# Patient Record
Sex: MALE | Race: WHITE | NOT HISPANIC OR LATINO | Employment: OTHER | URBAN - METROPOLITAN AREA
[De-identification: names, ages, dates, MRNs, and addresses within clinical notes are randomized per-mention and may not be internally consistent; named-entity substitution may affect disease eponyms.]

---

## 2018-06-08 ENCOUNTER — TELEPHONE (OUTPATIENT)
Dept: OTOLARYNGOLOGY | Facility: CLINIC | Age: 63
End: 2018-06-08

## 2018-06-08 NOTE — TELEPHONE ENCOUNTER
----- Message from Fern Turner sent at 6/8/2018  1:18 PM CDT -----  Contact: patient wife  [phone]-please call above patient wife at number in message need to reschedule appointment

## 2018-06-08 NOTE — TELEPHONE ENCOUNTER
Pt's appt pushed back, per request, however, advised to call Dr. Munoz's office and notify of delaying appt. Verbalized understanding.

## 2018-07-17 ENCOUNTER — OFFICE VISIT (OUTPATIENT)
Dept: OTOLARYNGOLOGY | Facility: CLINIC | Age: 63
End: 2018-07-17
Payer: COMMERCIAL

## 2018-07-17 VITALS
HEART RATE: 52 BPM | DIASTOLIC BLOOD PRESSURE: 64 MMHG | SYSTOLIC BLOOD PRESSURE: 102 MMHG | WEIGHT: 237.44 LBS | TEMPERATURE: 98 F

## 2018-07-17 DIAGNOSIS — C82.21 GRADE 3 FOLLICULAR LYMPHOMA OF LYMPH NODES OF NECK: ICD-10-CM

## 2018-07-17 DIAGNOSIS — I48.91 ATRIAL FIBRILLATION, UNSPECIFIED TYPE: ICD-10-CM

## 2018-07-17 DIAGNOSIS — I10 ESSENTIAL HYPERTENSION: ICD-10-CM

## 2018-07-17 DIAGNOSIS — C09.9 SQUAMOUS CELL CARCINOMA OF LEFT TONSIL: Primary | ICD-10-CM

## 2018-07-17 PROCEDURE — 31575 DIAGNOSTIC LARYNGOSCOPY: CPT | Mod: S$GLB,,, | Performed by: OTOLARYNGOLOGY

## 2018-07-17 PROCEDURE — 99205 OFFICE O/P NEW HI 60 MIN: CPT | Mod: 25,S$GLB,, | Performed by: OTOLARYNGOLOGY

## 2018-07-17 PROCEDURE — 99999 PR PBB SHADOW E&M-EST. PATIENT-LVL IV: CPT | Mod: PBBFAC,,, | Performed by: OTOLARYNGOLOGY

## 2018-07-17 RX ORDER — ROSUVASTATIN CALCIUM 10 MG/1
10 TABLET, COATED ORAL EVERY MORNING
COMMUNITY

## 2018-07-17 RX ORDER — ASPIRIN 81 MG/1
81 TABLET ORAL DAILY
COMMUNITY

## 2018-07-17 RX ORDER — CANDESARTAN 8 MG/1
8 TABLET ORAL EVERY MORNING
COMMUNITY

## 2018-07-17 RX ORDER — ATENOLOL 50 MG/1
50 TABLET ORAL EVERY MORNING
COMMUNITY

## 2018-07-17 RX ORDER — LIDOCAINE HYDROCHLORIDE 10 MG/ML
1 INJECTION, SOLUTION EPIDURAL; INFILTRATION; INTRACAUDAL; PERINEURAL ONCE
Status: CANCELLED | OUTPATIENT
Start: 2018-07-17 | End: 2018-07-17

## 2018-07-17 NOTE — LETTER
July 25, 2018      Rj Munoz MD  [address] [place] [address]           [facility]  [address]  [facility] [address]  Phone: [phone]  Fax: [phone]          Patient: Austyn Muñoz   MR Number: [id number]   YOB: 1955   Date of Visit: 7/17/2018       Dear Dr. Rj Munoz:    Thank you for referring Austyn Muñoz to me for evaluation. Attached you will find relevant portions of my assessment and plan of care.    If you have questions, please do not hesitate to call me. I look forward to following Austyn Muñoz along with you.    Sincerely,    Pedro Bettencourt  CC:  Art Grant MD    If you would like to receive this communication electronically, please contact [email or web address] or [phone] to request more information on [employer] Link access.    For providers and/or their staff who would like to refer a patient to [facility], please contact us through our one-stop-shop provider referral line, [facility], at [phone].    If you feel you have received this communication in error or would no longer like to receive these types of communications, please e-mail [email or web address]

## 2018-07-17 NOTE — PATIENT INSTRUCTIONS
The plan will be to re-excise the tonsil area and look at the throat carefully, then do the neck dissection.    We discussed the risks, benefits, and indications to left neck dissection. The risks were noted to include, but not be limited to, infection, bleeding, scarring, collection of blood or tissue fluid requiring drainage, weakness of the lip which may be temporary or permanent, weakness of the tongue which could be temporary or permanent and cause speech and/or swallowing difficulty, weakness of the shoulder which could be temporary or permanent, pain, chyle leakage which would require dietary modification and perhaps additional procedures, and the need for additional procedures.

## 2018-07-17 NOTE — PROGRESS NOTES
Subjective:       Patient ID: Austyn Muñoz is a 62 y.o. male.    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 Munoz for p16+ SCC of the left tonsil. He has been followed at [place] and at [facility] for relapsed follicular lymphoma which was initially treated with R-CHOP, then with Rituxan with good response. He is reportedly in remission from this. In early 2018, a routine PET scan revealed a slightly hypermetabolic area in the left tonsil, and a follow up PET in April revealed increased hypermetabolic activity in the left tonsil (10.2). Given his history, he underwent tonsillectomy with Dr. Munoz on 5/14. Prior to surgery, he denies sore throat, dysphagia, odynophagia, changes in his voice, or hemoptysis. He lost weight initially after surgery, but has gained it all back. He is breathing comfortably. He is a former smoker (1pack/day x 30 years), he quit 15 years ago. He also reports using smokeless tobacco x 40 years, and quit 2 months ago. He was referred here at that time, but he desired a second opinion and wanted to wait until July to come here.    He got a second opinion at [facility] (Dr. Koko Gallagher, Dr. Bowie, and Dr. Stanton). He had another CT and PET scan at [facility], which did not reveal any lymphadenopathy or evidence of disease. Ultimately, a neck dissection was  recommended, but he prefers to have surgery closer to home, so Dr. Gallagher referred him here. He basically has no complaints today.  He denies dysphagia, odynophagia, throat pain, and otalgia. His voice is normal. There is no hemoptysis or hematemesis. He is breathing well.    Past Medical History:   Diagnosis Date    Atrial fibrillation, currently in sinus rhythm     Glaucoma     Hyperlipidemia     Hypertension     Neuropathy     Non Hodgkin's lymphoma     in remission    Squamous cell carcinoma of left tonsil        Past Surgical History:   Procedure Laterality Date    ATRIAL ABLATION SURGERY      CERVICAL FUSION      CERVICAL SPINE SURGERY      SHOULDER SURGERY      TONSILLECTOMY           Current Outpatient Prescriptions:     aspirin (ECOTRIN) 81 MG EC tablet, Take 81 mg by mouth once daily., Disp: , Rfl:     atenolol (TENORMIN) 50 MG tablet, Take 50 mg by mouth once daily., Disp: , Rfl:     candesartan (ATACAND) 8 MG tablet, Take 8 mg by mouth once daily., Disp: , Rfl:     rosuvastatin (CRESTOR) 10 MG tablet, Take 10 mg by mouth once daily., Disp: , Rfl:     travoprost, benzalkonium, (TRAVATAN) 0.004 % ophthalmic solution, 1 drop every evening., Disp: , Rfl:     Review of patient's allergies indicates:   Allergen Reactions    Augmentin [amoxicillin-pot clavulanate] Rash       Social History     Social History    Marital status:      Spouse name: N/A    Number of children: N/A    Years of education: N/A     Occupational History    Not on file.     Social History Main Topics    Smoking status: Former Smoker     Quit date: 9/17/2006    Smokeless tobacco: Former User     Quit date: 5/13/2018    Alcohol use Not on file    Drug use: Unknown    Sexual activity: Not on file     Other Topics Concern    Not on file     Social History Narrative    No narrative on file       Family History   Problem Relation Age of Onset    Cancer Mother         breast    Hypertension Mother      Coronary artery disease Mother     Cancer Father         esophageal       Review of Systems   Constitutional: Negative for appetite change, chills, diaphoresis, fatigue, fever and unexpected weight change.   HENT: Positive for trouble swallowing and voice change. Negative for congestion, dental problem, drooling, ear discharge, ear pain, facial swelling, hearing loss, mouth sores, nosebleeds, postnasal drip, rhinorrhea, sinus pressure, sneezing, sore throat and tinnitus.    Eyes: Negative for pain, discharge, redness, itching and visual disturbance.   Respiratory: Negative for cough and shortness of breath.    Cardiovascular: Negative for chest pain and palpitations.   Gastrointestinal: Negative for abdominal distention, abdominal pain, constipation, diarrhea, nausea and vomiting.   Endocrine: Negative for cold intolerance and heat intolerance.   Genitourinary: Negative for difficulty urinating and dysuria.   Musculoskeletal: Positive for arthralgias. Negative for back pain, neck pain and neck stiffness.   Skin: Negative for color change and rash.   Neurological: Negative for dizziness, seizures, weakness and headaches.   Hematological: Negative for adenopathy. Does not bruise/bleed easily.   Psychiatric/Behavioral: Negative for agitation. The patient is not nervous/anxious.        Objective:      Physical Exam   Constitutional: He is oriented to person, place, and time. He appears well-developed and well-nourished. He is cooperative. He does not appear ill. No distress.   HENT:   Head: Normocephalic and atraumatic.   Right Ear: Hearing, tympanic membrane, external ear and ear canal normal.   Left Ear: Hearing, tympanic membrane, external ear and ear canal normal.   Nose: Septal deviation present. No mucosal edema, rhinorrhea or nasal deformity. No epistaxis.  No foreign bodies. Right sinus exhibits no maxillary sinus tenderness and no frontal sinus tenderness. Left sinus exhibits no maxillary sinus tenderness and  no frontal sinus tenderness.   Mouth/Throat: Uvula is midline, oropharynx is clear and moist and mucous membranes are normal. Mucous membranes are not pale, not dry and not cyanotic. He does not have dentures. No oral lesions. No trismus in the jaw. Normal dentition. No uvula swelling or dental caries. No oropharyngeal exudate, posterior oropharyngeal edema, posterior oropharyngeal erythema or tonsillar abscesses.       Procedure: Flexible laryngoscopy  In order to fully examine the upper aerodigestive tract, including the larynx, in a patient with a hyperactive gag reflex, flexible endoscopy is required.  After explaining the procedure and obtaining verbal consent, a timeout was performed with the patient's participation according to the universal protocol. Both nasal cavities were anesthetized with 4% Xylocaine spray mixed with Mick-Synephrine. The flexible laryngoscope (#[id number]) was inserted into the nasal cavity and advanced to visualize the nasal cavity, nasopharynx, the posterior oropharynx, hypopharynx, and the endolarynx with the above findings noted. The scope was removed and the procedure terminated. The patient tolerated this procedure well without apparent complication.      FINDINGS  Nasopharynx - the torus is clear. There are no lesions of the posterior wall.   Oropharynx - no lesions of the tongue base. There is no obvious fullness or asymmetry. I see no masses or lesions  Hypopharynx - there are no lesions of the pyriform sinuses or postcricoid region    Larynx - there are no lesions of the supraglottic or glottic larynx. Vocal fold mobility is normal with complete closure.      Eyes: Conjunctivae and EOM are normal. Pupils are equal, round, and reactive to light. Right eye exhibits no chemosis and no discharge. Left eye exhibits no chemosis and no discharge. No scleral icterus.   Neck: Trachea normal, normal range of motion and phonation normal. Neck supple. No JVD present. No tracheal tenderness  present. No tracheal deviation and no edema present. No thyroid mass and no thyromegaly present.   Salivary glands - there are no lesions or asymmetric findings in the submandibular or parotid glands     Cardiovascular: Normal rate, regular rhythm and intact distal pulses.    Pulmonary/Chest: Effort normal. No accessory muscle usage or stridor. No tachypnea. No respiratory distress.   Abdominal: Normal appearance. He exhibits no distension. There is no guarding.   Lymphadenopathy:        Head (right side): No submental, no submandibular, no tonsillar and no preauricular adenopathy present.        Head (left side): No submental, no submandibular, no tonsillar and no preauricular adenopathy present.     He has no cervical adenopathy.        Right cervical: No deep cervical and no posterior cervical adenopathy present.       Left cervical: No deep cervical and no posterior cervical adenopathy present.        Right: No supraclavicular adenopathy present.        Left: No supraclavicular adenopathy present.   Neurological: He is alert and oriented to person, place, and time. No cranial nerve deficit. Gait normal.   Skin: Skin is warm and dry. No lesion and no rash noted. He is not diaphoretic. No erythema. No pallor.   Psychiatric: He has a normal mood and affect. His speech is normal and behavior is normal. Thought content normal.   Vitals reviewed.      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 male who presents with an incidentally detected pT1 cN0 p16+ SCC of the left tonsil after a  tonsillectomy performed for an abnormal PET in the setting of prior relapsed follicular lymphoma. He was and remains asymptomatic, and he has no evidence of regional disease. Although the tumor is p16+, he is in an intermediate risk category because of his prior smoking history, so that further clouds the situation as we try to give him the treatment he needs without overtreating. I noted that his options are essentially additional surgery versus radiation therapy (to the primary site and the neck) or observation. His smoking history makes me averse to recommending observation. By report the margin is about 1.3mm away from tumor, and that is one that we often accept though regard as close in transoral surgery, though officially negative. He is at high risk for regional metastases, and we discussed how such small tumors as he had often present only with the neck metastases.    We discussed the risks and benefits of each approach, including short and long term side effects of radiation therapy and surgery. I am a little concerned by the left tonsil inferior pole abnormality, which could be just ongoing healing, but residual or recurrent disease is a possibility. Ultimately, I recommended a selective neck dissection of levels Ib (basically nodes posterior to the facial vessels, saving the gland) -IV and a re-excision of the left tonsil fossa because of the abnormality. He agrees with this plan.    We discussed the risks, benefits, indications, and alternatives to Transoral Oropharyngeal Resection using either the DaVinci system, manual techniques, or the CO2 laser. The choice of robot, laser, or electrocautery depends on patient anatomy and exposure, as the instrument is simply a cutting tool or mechanism - the key component is the transoral approach. The treatment options are noted above. The risks of TORS/transoral surgery were described to include, but not be limited to, infection, bleeding (both on the day of  surgery within the first 4 hours and again 5-14 days after surgery, when the scabs come off), scarring, fire, changes in voice or swallowing, positive margins, the need for staged surgery, pain, and the need for additional procedures or treatments. Time was allowed for questions, and all questions were answered to the patient's apparent satisfaction. He may need a feeding tube placed transnasally for several days to weeks, and some patients even require a PEG tube for swallowing diificulty on a temporary (and rarely permanent) basis. Informed consent was obtained. Because of the limited aims of this surgery for him, this is less likely.    We discussed the risks, benefits, and indications to left neck dissection. The risks were noted to include, but not be limited to, infection, bleeding, scarring, collection of blood or tissue fluid requiring drainage, weakness of the lip which may be temporary or permanent, weakness of the tongue which could be temporary or permanent and cause speech and/or swallowing difficulty, weakness of the shoulder which could be temporary or permanent, pain, chyle leakage which would require dietary modification and perhaps additional procedures, and the need for additional procedures. Time was allowed for questions, and all questions were answered to the patient's apparent satisfaction. Informed consent was obtained.    His surgery has been scheduled for 3 weeks from now. Paperwork will be signed on the morning of surgery.             Relevant Orders    Case Request Operating Room: DISSECTION, NECK, TONSILLECTOMY (Completed)    Essential hypertension    Atrial fibrillation

## 2018-07-24 ENCOUNTER — TUMOR BOARD CONFERENCE (OUTPATIENT)
Dept: OTOLARYNGOLOGY | Facility: CLINIC | Age: 63
End: 2018-07-24

## 2018-07-24 NOTE — PROGRESS NOTES
Austyn Muñoz is a 62 y.o. male with T1N0M0 p16+ SCC of the tonsil.    His case was discussed at the Multidisciplinary Head and Neck Team Planning Meeting on 07/24/2018. Representatives from Medical Oncology, Radiation Oncology, Head and Neck Surgical Oncology, Psychosocial Oncology, and Speech and Language Pathology discussed the case with the following recommendations:    1) Neck dissection with re-excision of tonsil fossa

## 2018-07-28 NOTE — ASSESSMENT & PLAN NOTE
Austyn Muñoz is a 62 y.o. male who presents with an incidentally detected pT1 cN0 p16+ SCC of the left tonsil after a tonsillectomy performed for an abnormal PET in the setting of prior relapsed follicular lymphoma. He was and remains asymptomatic, and he has no evidence of regional disease. Although the tumor is p16+, he is in an intermediate risk category because of his prior smoking history, so that further clouds the situation as we try to give him the treatment he needs without overtreating. I noted that his options are essentially additional surgery versus radiation therapy (to the primary site and the neck) or observation. His smoking history makes me averse to recommending observation. By report the margin is about 1.3mm away from tumor, and that is one that we often accept though regard as close in transoral surgery, though officially negative. He is at high risk for regional metastases, and we discussed how such small tumors as he had often present only with the neck metastases.    We discussed the risks and benefits of each approach, including short and long term side effects of radiation therapy and surgery. I am a little concerned by the left tonsil inferior pole abnormality, which could be just ongoing healing, but residual or recurrent disease is a possibility. Ultimately, I recommended a selective neck dissection of levels Ib (basically nodes posterior to the facial vessels, saving the gland) -IV and a re-excision of the left tonsil fossa because of the abnormality. He agrees with this plan.    We discussed the risks, benefits, indications, and alternatives to Transoral Oropharyngeal Resection using either the DaVinci system, manual techniques, or the CO2 laser. The choice of robot, laser, or electrocautery depends on patient anatomy and exposure, as the instrument is simply a cutting tool or mechanism - the key component is the transoral approach. The treatment options are noted above. The  risks of TORS/transoral surgery were described to include, but not be limited to, infection, bleeding (both on the day of surgery within the first 4 hours and again 5-14 days after surgery, when the scabs come off), scarring, fire, changes in voice or swallowing, positive margins, the need for staged surgery, pain, and the need for additional procedures or treatments. Time was allowed for questions, and all questions were answered to the patient's apparent satisfaction. He may need a feeding tube placed transnasally for several days to weeks, and some patients even require a PEG tube for swallowing diificulty on a temporary (and rarely permanent) basis. Informed consent was obtained. Because of the limited aims of this surgery for him, this is less likely.    We discussed the risks, benefits, and indications to left neck dissection. The risks were noted to include, but not be limited to, infection, bleeding, scarring, collection of blood or tissue fluid requiring drainage, weakness of the lip which may be temporary or permanent, weakness of the tongue which could be temporary or permanent and cause speech and/or swallowing difficulty, weakness of the shoulder which could be temporary or permanent, pain, chyle leakage which would require dietary modification and perhaps additional procedures, and the need for additional procedures. Time was allowed for questions, and all questions were answered to the patient's apparent satisfaction. Informed consent was obtained.    His surgery has been scheduled for 3 weeks from now. Paperwork will be signed on the morning of surgery.

## 2018-08-02 ENCOUNTER — ANESTHESIA EVENT (OUTPATIENT)
Dept: SURGERY | Facility: HOSPITAL | Age: 63
DRG: 129 | End: 2018-08-02
Payer: COMMERCIAL

## 2018-08-02 DIAGNOSIS — E86.0 DEHYDRATION: ICD-10-CM

## 2018-08-02 DIAGNOSIS — Z01.818 PREOPERATIVE TESTING: Primary | ICD-10-CM

## 2018-08-02 RX ORDER — FOLIC ACID 1 MG/1
1 TABLET ORAL DAILY
COMMUNITY
End: 2018-08-17

## 2018-08-02 NOTE — ANESTHESIA PREPROCEDURE EVALUATION
Pre Admission Screening  Leanna Rubalcava RN      []Hide copied text  []Hover for attribution information  Anesthesia Assessment: Preoperative EQUATION     Planned Procedure: Procedure(s) (LRB):  DISSECTION, NECK (Left)  TONSILLECTOMY (Left)  Requested Anesthesia Type:General  Surgeon: Andrea Price MD  Service: ENT  Known or anticipated Date of Surgery:8/15/2018     Surgeon notes: reviewed        Last PCP note: outside [facility]   Subspecialty notes: Cardiology: General, ENT     Other important co-morbidities: per EPIC: HTN/HLP, A fib (S/P ablation 1/2016), non Hodgkins lymphona     Tests already available:  Available tests,  outside [facility] . will ask cards for last EKG/echo                            Instructions given. (See in Nurse's note)     Optimization:  Anesthesia Preop Clinic Assessment  Indicated-pt plans on staying in [place] the night prior to surgery, will come to POC 8/14     Medical Opinion Indicated-will ask cards to clear pt, hold aspirin, and send last testing                                           Plan:    Testing:  BMP, Heme prof, T&S and EKG (if not obtained from OS cards)   Pre-anesthesia  visit                                        Visit focus: concerns in complex and/or prolonged anesthesia                           Consultation:cards                           Patient  has previously scheduled Medical Appointment: none     Navigation: Tests Scheduled. 8/14                        Consults scheduled.                        Results will be tracked by Preop Clinic.                                Electronically signed by Leanna Rubalcava RN at 8/2/2018  3:16 PM        Pre-admit on 8/15/2018            Detailed Report        8/9/18-OS cards clearance/ASA instructions, 2/2018 EKG & last visit note, 1/2017 echo, 2/2017 exercise perfusion test obtained and scanned to media. Preop EKG appt cancelled.                                                                                                        08/02/2018  Pre-operative evaluation for Procedure(s) (LRB):  DISSECTION, NECK (Left)  TONSILLECTOMY (Left)    Austyn Muñoz is a 62 y.o. male with PMHx of glaucoma, chronic diarrhea, cervical disease and lumbar disease, neuropathy, paroxysmal Atrial fibrillation (s/p ablation in 2016), HTN, HLD,CAD, non-hodgkin lymphoma s/p chemotherapy with relapse in 2009 and 2016 treated with rituxan, and now scc of the left tonsil (s/p bilateral tonsillectomy on 5/14/18) who presents for pre-op eval for above procedure.         Patient currently denies any cardiopulmonary symptoms including MARRERO, orthopnea, chest pain, wheezing, palpitations.      Diarrhea improved in stool bulk and frequency.  He has a 30pk/yr history, quit smoking in 2003. Quit smokeless tobacco 5/2018. Patient currently drinks about 3 beers a day. Has never experienced withdrawal symptoms from drinking.    Patient has been cleared by outside cardiologist. Report with ekg and echo can be found in media tab. Patient now in sinus rhythm with rate in the 50s. Echo with EF of 50% without evidence of diastolic dysfunction. No major valvular abnormalities. Exercise Myocardial perfusion 2/2017 without evidence ischemia. CXR from care everywhere with no abormality 5/2018.     Labs from 5/2017, CBC, CMP, INR in care everywhere unrevealing aside from T.bili of 1.4.     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      atenolol (TENORMIN) 50 MG tablet Take 50 mg by mouth once daily.      candesartan (ATACAND) 8 MG tablet Take 8 mg by mouth once daily.      folic acid (FOLVITE) 1 MG tablet Take 1 mg by mouth once daily.      rosuvastatin (CRESTOR) 10 MG tablet Take 10 mg by mouth once daily.      travoprost, benzalkonium, (TRAVATAN) 0.004 % ophthalmic solution 1 drop every evening.       No current facility-administered medications on file prior to encounter.        Past Surgical History:   Procedure Laterality Date    ATRIAL ABLATION SURGERY      CERVICAL FUSION      CERVICAL SPINE SURGERY      SHOULDER SURGERY      TONSILLECTOMY         Social History     Socioeconomic History    Marital status:      Spouse name: Not on file    Number of children: Not on file    Years of education: Not on file    Highest education level: Not on file   Social Needs    Financial resource strain: Not on file    Food insecurity - worry: Not on file    Food insecurity - inability: Not on file    Transportation needs - medical: Not on file    Transportation needs - non-medical: Not on file   Occupational History    Not on file   Tobacco Use    Smoking status: Former Smoker     Last attempt to quit: 2006     Years since quittin.9    Smokeless tobacco: Former User     Quit date: 2018   Substance and Sexual Activity    Alcohol use: Not on file    Drug use: Not on file    Sexual activity: Not on file   Other Topics Concern    Not on file   Social History Narrative    Not on file         Vital Signs Range (Last 24H):         No results found for: WBC, HGB, HCT, MCV, PLT      No results found for: NA, K, CL, CO2, GLU, BUN, CREATININE, CALCIUM, PROT, ALBUMIN, BILITOT, ALKPHOS, AST, ALT, ANIONGAP, ESTGFRAFRICA, EGFRNONAA    No results found for: INR,  PROTIME        Diagnostic Studies:  CXR (care everywhere [facility])  Result Narrative   CHEST EPA AND LATERAL   5/7/2018 1105 hours    COMPARISON: None.    HISTORY: Preoperative for tonsillectomy.    FINDINGS: Heart size is within normal limits.   No pulmonary opacity or pleural effusion is evident.         EKG:  Ordered  Media 2/2018- Sinus bradycardia rate of 54    2D Echo: 1/2017 in media  EF 60%, diastolic function normal for age, minimal TR and minimal MR        Anesthesia Evaluation    I have reviewed the Patient Summary Reports.    I have reviewed the Nursing Notes.   I have reviewed the Medications.     Review of Systems  Anesthesia Hx:  Hx of Anesthetic complications (shivering post-operatively)  History of prior surgery of interest to airway management or planning: Previous anesthesia: General, MAC Denies Family Hx of Anesthesia complications.  Personal Hx of Anesthesia complications (shivering post-operatively)   Social:  Former Smoker, Alcohol Use    Hematology/Oncology:         -- Denies Anemia: Current/Recent Cancer. (non-hodgkin lymphoma, scc of tonsil) chemotherapy and surgery   EENT/Dental:   denies chronic allergic rhinitis  Denies Otitis Media Denies Chronic Tonsillitis   Cardiovascular:   Hypertension CAD    Denies Angina.         hyperlipidemia        Pulmonary:   Denies COPD.  Denies Shortness of breath.  Denies Sleep Apnea.    Renal/:   Denies Chronic Renal Disease. no renal calculi     Hepatic/GI:   Denies PUD. Denies GERD. Denies Liver Disease.    Musculoskeletal:  Spine Disorders: cervical and lumbar Disc disease and Degenerative disease    Neurological:   Denies TIA. Denies CVA.  Denies Headaches. Denies Seizures.   Peripheral Neuropathy    Endocrine:   Denies Diabetes. Denies Hypothyroidism. Denies Hyperthyroidism.    Psych:   Denies Psychiatric History. denies anxiety denies depression          Physical Exam  General:  Well nourished    Airway/Jaw/Neck:  Airway Findings:  Mouth Opening: Normal Tongue: Normal  General Airway Assessment: Adult  Mallampati: III  TM Distance: 4 - 6 cm  Jaw/Neck Findings:  Neck ROM: Extension Decreased, Severe, Decreased Lateral Motion, to the right, to the left     Eyes/Ears/Nose:  EYES/EARS/NOSE FINDINGS: Normal   Dental:  Dental Findings: molar caps    Chest/Lungs:  Chest/Lungs Findings: Clear to auscultation     Heart/Vascular:  Heart Findings: Rate: Normal  Rhythm: Regular Rhythm  Sounds: Normal     Abdomen:  Abdomen Findings:  Soft, Nontender      Skin:  Skin Findings: Normal    Mental Status:  Mental Status Findings:  Cooperative, Alert and Oriented         Anesthesia Plan  Type of Anesthesia, risks & benefits discussed:  Anesthesia Type:  general  Patient's Preference:   Intra-op Monitoring Plan: standard ASA monitors  Intra-op Monitoring Plan Comments:   Post Op Pain Control Plan: multimodal analgesia, IV/PO Opioids PRN and per primary service following discharge from PACU  Post Op Pain Control Plan Comments:   Induction:   IV  Beta Blocker:  Patient is on a Beta-Blocker and has received one dose within the past 24 hours (No further documentation required).       Informed Consent: Patient understands risks and agrees with Anesthesia plan.  Questions answered. Anesthesia consent signed with patient.  ASA Score: 3     Day of Surgery Review of History & Physical:    H&P update referred to the surgeon.         Ready For Surgery From Anesthesia Perspective.

## 2018-08-14 ENCOUNTER — TELEPHONE (OUTPATIENT)
Dept: OTOLARYNGOLOGY | Facility: CLINIC | Age: 63
End: 2018-08-14

## 2018-08-14 ENCOUNTER — HOSPITAL ENCOUNTER (OUTPATIENT)
Dept: PREADMISSION TESTING | Facility: HOSPITAL | Age: 63
Discharge: HOME | DRG: 129 | End: 2018-08-14
Attending: ANESTHESIOLOGY
Payer: COMMERCIAL

## 2018-08-14 VITALS
SYSTOLIC BLOOD PRESSURE: 103 MMHG | BODY MASS INDEX: 32.23 KG/M2 | RESPIRATION RATE: 18 BRPM | HEART RATE: 57 BPM | HEIGHT: 72 IN | WEIGHT: 238 LBS | DIASTOLIC BLOOD PRESSURE: 56 MMHG | OXYGEN SATURATION: 96 % | TEMPERATURE: 98 F

## 2018-08-14 DIAGNOSIS — K52.9 CHRONIC DIARRHEA: ICD-10-CM

## 2018-08-14 DIAGNOSIS — I25.10 CORONARY ARTERY DISEASE INVOLVING NATIVE HEART, ANGINA PRESENCE UNSPECIFIED, UNSPECIFIED VESSEL OR LESION TYPE: ICD-10-CM

## 2018-08-14 DIAGNOSIS — M51.36 BULGING LUMBAR DISC: ICD-10-CM

## 2018-08-14 DIAGNOSIS — G62.9 NEUROPATHY: ICD-10-CM

## 2018-08-14 DIAGNOSIS — H40.9 GLAUCOMA, UNSPECIFIED GLAUCOMA TYPE, UNSPECIFIED LATERALITY: ICD-10-CM

## 2018-08-14 NOTE — PRE-PROCEDURE INSTRUCTIONS
Pt was seen in the pre-op center today. Pre-op instructions given including NPO after MN, medications to take/hold the morning of surgery, arrival procedure and location, shower with antibacterial soap; anesthesia type discussed and pre-op assessment completed. Pts questions answered and concerns addressed. Pt verbalized understanding.     Refilled for pt.

## 2018-08-14 NOTE — DISCHARGE INSTRUCTIONS
Your surgery has been scheduled for:__________________________________________    You should report to:  ____[facility], located on the [place] side of the first floor of the           [facility] ([phone])  ____[facility], located on the [facility] side of the            Second floor of the [facility] ([phone])  ____3rd Floor [facility] located on the [facility] side of the [facility] [phone]  Please Note   - Tell your doctor if you take Aspirin, products containing Aspirin, herbal medications  or blood thinners, such as Coumadin, Ticlid, or Plavix.  (Consult your provider regarding holding or stopping before surgery).  - Arrange for someone to drive you home following surgery.  You will not be allowed to leave the surgical facility alone or drive yourself home following sedation and anesthesia.  Before Surgery  - Stop taking all herbal medications 14days prior to surgery  - No Motrin/Advil (Ibuprofen) 7 days before surgery  - No Aleve (Naproxen) 7 days before surgery  - Stop Taking Asprin, products containing Asprin _____days before surgery  - Stop taking blood thinners_______days before surgery  - No Goody's/BC  Powder 7 days before surgery  - Refrain from drinking alcoholic beverages for 24hours before and after surgery  - Stop or limit smoking _________days before surgery  - You may take Tylenol for pain    Night before Surgery    NOTHING TO EAT OR DRINK AFTER MIDNIGHT (OR FOLLOW SURGEON'S INSTRUCTIONS)   - Take a shower or bath (shower is recommended).  Bathe with Hibiclens soap or an antibacterial soap from the neck down.  If not supplied by your surgeon, hibiclens soap will need to be purchased over the counter in pharmacy.  Rinse soap off thoroughly.  - Shampoo your hair with your regular shampoo  The Day of Surgery  ·  If you are told to take medication on the morning of surgery, it may be taken  with a sip of water.   - Take another bath or shower with hibiclens or any antibacterial soap, to reduce the chance of infection.  - Take heart and blood pressure medications with a small sip of water, as advised by the perioperative team.  - Do not take fluid pills  - You may brush your teeth and rinse your mouth, but do not swall any additional water.   - Do not apply perfumes, powder, body lotions or deodorant on the day of surgery.  - Nail polish should be removed.  - Do not wear makeup or moisturizer  - Wear comfortable clothes, such as a button front shirt and loose fitting pants.  - Leave all jewelry, including body piercings, and valuables at home.    - Bring any devices you will neeed after surgery such as crutches or canes.  - If you have sleep apnea, please bring your CPAP machine  In the event that your physical condition changes including the onset of a cold or respiratory illness, or if you have to delay or cancel your surgery, please notify your surgeon.    Anesthesia: General Anesthesia  Youre due to have surgery. During surgery, youll be given medication called anesthesia. (It is also called anesthetic.) This will keep you comfortable and pain-free. Your anesthesia provider will use general anesthesia. This sheet tells you more about it.  What is general anesthesia?     You are watched continuously during your procedure by the anesthesia provider   General anesthesia puts you into a state like deep sleep. It goes into the bloodstream (IV anesthetics), into the lungs (gas anesthetics), or both. You feel nothing during the procedure. You will not remember it. During the procedure, the anesthesia provider monitors you continuously. He or she checks your heart rate and rhythm, blood pressure, breathing, and blood oxygen.  · IV Anesthetics. IV anesthetics are given through an IV line in your arm. Theyre often given first. This is so you are asleep before a gas anesthetic is started. Some kinds of IV  anesthetics relieve pain. Others relax you. Your doctor will decide which kind is best in your case.  · Gas Anesthetics. Gas anesthetics are breathed into the lungs. They are often used to keep you asleep. They can be given through a facemask or a tube placed in your larynx or trachea (breathing tube).  ? If you have a facemask, your anesthesia provider will most likely place it over your nose and mouth while youre still awake. Youll breathe oxygen through the mask as your IV anesthetic is started. Gas anesthetic may be added through the mask.  ? If you have a tube in the larynx or trachea, it will be inserted into your throat after youre asleep.  Anesthesia tools and medications  You will likely have:  · IV anesthetics. These are put into an IV line into your bloodstream.  · Gas anesthetics. You breathe these anesthetics into your lungs, where they pass into your bloodstream.  · Pulse oximeter. This is a small clip that is attached to the end of your finger. This measures your blood oxygen level.  · Electrocardiography leads (electrodes). These are small sticky pads that are placed on your chest. They record your heart rate and rhythm.  · Blood pressure cuff. This reads your blood pressure.  Risks and possible complications  General anesthesia has some risks. These include:  · Breathing problems  · Nausea and vomiting  · Sore throat or hoarseness (usually temporary)  · Allergic reaction to the anesthetic  · Irregular heartbeat (rare)  · Cardiac arrest (rare)   Anesthesia safety  · Follow all instructions you are given for how long not to eat or drink before your procedure.  · Be sure your doctor knows what medications and drugs you take. This includes over-the-counter medications, herbs, supplements, alcohol or other drugs. You will be asked when those were last taken.  · Have an adult family member or friend drive you home after the procedure.  · For the first 24 hours after your surgery:  ? Do not drive or use  heavy equipment.  ? Have a trusted family member or spouse make important decisions or sign documents.  ? Avoid alcohol.  ? Have a responsible adult stay with you. He or she can watch for problems and help keep you safe.  Date Last Reviewed: 10/16/2014  © 6305-7442 [employer]. [address] [place] [id number]. All rights reserved. This information is not intended as a substitute for professional medical care. Always follow your healthcare professional's instructions.

## 2018-08-15 ENCOUNTER — HOSPITAL ENCOUNTER (INPATIENT)
Facility: HOSPITAL | Age: 63
LOS: 1 days | Discharge: HOME | DRG: 129 | End: 2018-08-16
Attending: OTOLARYNGOLOGY | Admitting: OTOLARYNGOLOGY
Payer: COMMERCIAL

## 2018-08-15 ENCOUNTER — ANESTHESIA (OUTPATIENT)
Dept: SURGERY | Facility: HOSPITAL | Age: 63
DRG: 129 | End: 2018-08-15
Payer: COMMERCIAL

## 2018-08-15 DIAGNOSIS — C09.9 SQUAMOUS CELL CARCINOMA OF LEFT TONSIL: Primary | ICD-10-CM

## 2018-08-15 DIAGNOSIS — I48.91 ATRIAL FIBRILLATION, UNSPECIFIED TYPE: ICD-10-CM

## 2018-08-15 PROCEDURE — 63600175 PHARM REV CODE 636 W HCPCS: Performed by: NURSE ANESTHETIST, CERTIFIED REGISTERED

## 2018-08-15 PROCEDURE — S0077 INJECTION, CLINDAMYCIN PHOSP: HCPCS | Performed by: OTOLARYNGOLOGY

## 2018-08-15 PROCEDURE — 42842 EXTENSIVE SURGERY OF THROAT: CPT | Mod: 51,,, | Performed by: OTOLARYNGOLOGY

## 2018-08-15 PROCEDURE — 88307 TISSUE EXAM BY PATHOLOGIST: CPT | Mod: 26,,, | Performed by: PATHOLOGY

## 2018-08-15 PROCEDURE — 38724 REMOVAL OF LYMPH NODES NECK: CPT | Mod: LT,,, | Performed by: OTOLARYNGOLOGY

## 2018-08-15 PROCEDURE — D9220A PRA ANESTHESIA: Mod: CRNA,,, | Performed by: NURSE ANESTHETIST, CERTIFIED REGISTERED

## 2018-08-15 PROCEDURE — 25000003 PHARM REV CODE 250: Performed by: OTOLARYNGOLOGY

## 2018-08-15 PROCEDURE — 41120 PARTIAL REMOVAL OF TONGUE: CPT | Mod: 51,,, | Performed by: OTOLARYNGOLOGY

## 2018-08-15 PROCEDURE — 0CTPXZZ RESECTION OF TONSILS, EXTERNAL APPROACH: ICD-10-PCS | Performed by: OTOLARYNGOLOGY

## 2018-08-15 PROCEDURE — D9220A PRA ANESTHESIA: Mod: ANES,,, | Performed by: ANESTHESIOLOGY

## 2018-08-15 PROCEDURE — 25000003 PHARM REV CODE 250: Performed by: STUDENT IN AN ORGANIZED HEALTH CARE EDUCATION/TRAINING PROGRAM

## 2018-08-15 PROCEDURE — 0CB7XZZ EXCISION OF TONGUE, EXTERNAL APPROACH: ICD-10-PCS | Performed by: OTOLARYNGOLOGY

## 2018-08-15 PROCEDURE — 71000039 HC RECOVERY, EACH ADD'L HOUR: Performed by: OTOLARYNGOLOGY

## 2018-08-15 PROCEDURE — 36000707: Performed by: OTOLARYNGOLOGY

## 2018-08-15 PROCEDURE — 63600175 PHARM REV CODE 636 W HCPCS: Performed by: OTOLARYNGOLOGY

## 2018-08-15 PROCEDURE — 88309 TISSUE EXAM BY PATHOLOGIST: CPT | Mod: 26,,, | Performed by: PATHOLOGY

## 2018-08-15 PROCEDURE — 63600175 PHARM REV CODE 636 W HCPCS: Performed by: ANESTHESIOLOGY

## 2018-08-15 PROCEDURE — 25000003 PHARM REV CODE 250: Performed by: NURSE ANESTHETIST, CERTIFIED REGISTERED

## 2018-08-15 PROCEDURE — 36000706: Performed by: OTOLARYNGOLOGY

## 2018-08-15 PROCEDURE — 07T20ZZ RESECTION OF LEFT NECK LYMPHATIC, OPEN APPROACH: ICD-10-PCS | Performed by: OTOLARYNGOLOGY

## 2018-08-15 PROCEDURE — 88331 PATH CONSLTJ SURG 1 BLK 1SPC: CPT | Mod: 26,,, | Performed by: PATHOLOGY

## 2018-08-15 PROCEDURE — 94761 N-INVAS EAR/PLS OXIMETRY MLT: CPT

## 2018-08-15 PROCEDURE — 71000033 HC RECOVERY, INTIAL HOUR: Performed by: OTOLARYNGOLOGY

## 2018-08-15 PROCEDURE — 27201423 OPTIME MED/SURG SUP & DEVICES STERILE SUPPLY: Performed by: OTOLARYNGOLOGY

## 2018-08-15 PROCEDURE — 88331 PATH CONSLTJ SURG 1 BLK 1SPC: CPT | Performed by: PATHOLOGY

## 2018-08-15 PROCEDURE — 63600175 PHARM REV CODE 636 W HCPCS: Performed by: STUDENT IN AN ORGANIZED HEALTH CARE EDUCATION/TRAINING PROGRAM

## 2018-08-15 PROCEDURE — 37000008 HC ANESTHESIA 1ST 15 MINUTES: Performed by: OTOLARYNGOLOGY

## 2018-08-15 PROCEDURE — 27000221 HC OXYGEN, UP TO 24 HOURS

## 2018-08-15 PROCEDURE — 37000009 HC ANESTHESIA EA ADD 15 MINS: Performed by: OTOLARYNGOLOGY

## 2018-08-15 PROCEDURE — 20600001 HC STEP DOWN PRIVATE ROOM

## 2018-08-15 RX ORDER — NEOSTIGMINE METHYLSULFATE 1 MG/ML
INJECTION, SOLUTION INTRAVENOUS
Status: DISCONTINUED | OUTPATIENT
Start: 2018-08-15 | End: 2018-08-15

## 2018-08-15 RX ORDER — HYDROCODONE BITARTRATE AND ACETAMINOPHEN 7.5; 325 MG/15ML; MG/15ML
15 SOLUTION ORAL EVERY 4 HOURS PRN
Status: DISCONTINUED | OUTPATIENT
Start: 2018-08-15 | End: 2018-08-16 | Stop reason: HOSPADM

## 2018-08-15 RX ORDER — EPHEDRINE SULFATE 50 MG/ML
INJECTION, SOLUTION INTRAVENOUS
Status: DISCONTINUED | OUTPATIENT
Start: 2018-08-15 | End: 2018-08-15

## 2018-08-15 RX ORDER — CLINDAMYCIN PHOSPHATE 900 MG/50ML
900 INJECTION, SOLUTION INTRAVENOUS
Status: COMPLETED | OUTPATIENT
Start: 2018-08-15 | End: 2018-08-15

## 2018-08-15 RX ORDER — ROSUVASTATIN CALCIUM 10 MG/1
10 TABLET, COATED ORAL EVERY MORNING
Status: DISCONTINUED | OUTPATIENT
Start: 2018-08-16 | End: 2018-08-16 | Stop reason: HOSPADM

## 2018-08-15 RX ORDER — OXYMETAZOLINE HCL 0.05 %
SPRAY, NON-AEROSOL (ML) NASAL
Status: DISCONTINUED | OUTPATIENT
Start: 2018-08-15 | End: 2018-08-15 | Stop reason: HOSPADM

## 2018-08-15 RX ORDER — ACETAMINOPHEN 10 MG/ML
INJECTION, SOLUTION INTRAVENOUS
Status: DISCONTINUED | OUTPATIENT
Start: 2018-08-15 | End: 2018-08-15

## 2018-08-15 RX ORDER — LIDOCAINE HYDROCHLORIDE 10 MG/ML
1 INJECTION, SOLUTION EPIDURAL; INFILTRATION; INTRACAUDAL; PERINEURAL ONCE
Status: DISCONTINUED | OUTPATIENT
Start: 2018-08-15 | End: 2018-08-15 | Stop reason: HOSPADM

## 2018-08-15 RX ORDER — DEXTROSE MONOHYDRATE, SODIUM CHLORIDE, AND POTASSIUM CHLORIDE 50; 1.49; 4.5 G/1000ML; G/1000ML; G/1000ML
INJECTION, SOLUTION INTRAVENOUS CONTINUOUS
Status: DISCONTINUED | OUTPATIENT
Start: 2018-08-15 | End: 2018-08-16 | Stop reason: HOSPADM

## 2018-08-15 RX ORDER — TRAVOPROST OPHTHALMIC SOLUTION 0.04 MG/ML
1 SOLUTION OPHTHALMIC NIGHTLY
Status: DISCONTINUED | OUTPATIENT
Start: 2018-08-15 | End: 2018-08-16 | Stop reason: HOSPADM

## 2018-08-15 RX ORDER — PROMETHAZINE HYDROCHLORIDE 6.25 MG/5ML
12.5 SYRUP ORAL EVERY 6 HOURS PRN
Status: DISCONTINUED | OUTPATIENT
Start: 2018-08-15 | End: 2018-08-16 | Stop reason: HOSPADM

## 2018-08-15 RX ORDER — ONDANSETRON 2 MG/ML
INJECTION INTRAMUSCULAR; INTRAVENOUS
Status: DISPENSED
Start: 2018-08-15 | End: 2018-08-16

## 2018-08-15 RX ORDER — HYDROMORPHONE HYDROCHLORIDE 1 MG/ML
0.5 INJECTION, SOLUTION INTRAMUSCULAR; INTRAVENOUS; SUBCUTANEOUS
Status: DISCONTINUED | OUTPATIENT
Start: 2018-08-15 | End: 2018-08-16 | Stop reason: HOSPADM

## 2018-08-15 RX ORDER — DEXAMETHASONE SODIUM PHOSPHATE 4 MG/ML
INJECTION, SOLUTION INTRA-ARTICULAR; INTRALESIONAL; INTRAMUSCULAR; INTRAVENOUS; SOFT TISSUE
Status: DISCONTINUED | OUTPATIENT
Start: 2018-08-15 | End: 2018-08-15

## 2018-08-15 RX ORDER — LIDOCAINE HCL/PF 100 MG/5ML
SYRINGE (ML) INTRAVENOUS
Status: DISCONTINUED | OUTPATIENT
Start: 2018-08-15 | End: 2018-08-15

## 2018-08-15 RX ORDER — GLYCOPYRROLATE 0.2 MG/ML
INJECTION INTRAMUSCULAR; INTRAVENOUS
Status: DISCONTINUED | OUTPATIENT
Start: 2018-08-15 | End: 2018-08-15

## 2018-08-15 RX ORDER — ENOXAPARIN SODIUM 100 MG/ML
40 INJECTION SUBCUTANEOUS EVERY 24 HOURS
Status: DISCONTINUED | OUTPATIENT
Start: 2018-08-16 | End: 2018-08-16 | Stop reason: HOSPADM

## 2018-08-15 RX ORDER — CANDESARTAN 8 MG/1
8 TABLET ORAL EVERY MORNING
Status: DISCONTINUED | OUTPATIENT
Start: 2018-08-16 | End: 2018-08-16 | Stop reason: HOSPADM

## 2018-08-15 RX ORDER — SODIUM CHLORIDE 0.9 % (FLUSH) 0.9 %
3 SYRINGE (ML) INJECTION
Status: DISCONTINUED | OUTPATIENT
Start: 2018-08-15 | End: 2018-08-16 | Stop reason: HOSPADM

## 2018-08-15 RX ORDER — MIDAZOLAM HYDROCHLORIDE 1 MG/ML
INJECTION, SOLUTION INTRAMUSCULAR; INTRAVENOUS
Status: DISCONTINUED | OUTPATIENT
Start: 2018-08-15 | End: 2018-08-15

## 2018-08-15 RX ORDER — SODIUM CHLORIDE 9 MG/ML
INJECTION, SOLUTION INTRAVENOUS CONTINUOUS PRN
Status: DISCONTINUED | OUTPATIENT
Start: 2018-08-15 | End: 2018-08-15

## 2018-08-15 RX ORDER — PROPOFOL 10 MG/ML
VIAL (ML) INTRAVENOUS
Status: DISCONTINUED | OUTPATIENT
Start: 2018-08-15 | End: 2018-08-15

## 2018-08-15 RX ORDER — LIDOCAINE HYDROCHLORIDE AND EPINEPHRINE 10; 10 MG/ML; UG/ML
INJECTION, SOLUTION INFILTRATION; PERINEURAL
Status: DISCONTINUED | OUTPATIENT
Start: 2018-08-15 | End: 2018-08-15 | Stop reason: HOSPADM

## 2018-08-15 RX ORDER — ONDANSETRON 2 MG/ML
8 INJECTION INTRAMUSCULAR; INTRAVENOUS EVERY 8 HOURS PRN
Status: DISCONTINUED | OUTPATIENT
Start: 2018-08-15 | End: 2018-08-16 | Stop reason: HOSPADM

## 2018-08-15 RX ORDER — ATENOLOL 50 MG/1
50 TABLET ORAL EVERY MORNING
Status: DISCONTINUED | OUTPATIENT
Start: 2018-08-16 | End: 2018-08-16 | Stop reason: HOSPADM

## 2018-08-15 RX ORDER — FENTANYL CITRATE 50 UG/ML
INJECTION, SOLUTION INTRAMUSCULAR; INTRAVENOUS
Status: DISCONTINUED | OUTPATIENT
Start: 2018-08-15 | End: 2018-08-15

## 2018-08-15 RX ORDER — HYDROMORPHONE HYDROCHLORIDE 1 MG/ML
0.2 INJECTION, SOLUTION INTRAMUSCULAR; INTRAVENOUS; SUBCUTANEOUS EVERY 5 MIN PRN
Status: DISCONTINUED | OUTPATIENT
Start: 2018-08-15 | End: 2018-08-15 | Stop reason: HOSPADM

## 2018-08-15 RX ORDER — MEPERIDINE HYDROCHLORIDE 50 MG/ML
12.5 INJECTION INTRAMUSCULAR; INTRAVENOUS; SUBCUTANEOUS EVERY 10 MIN PRN
Status: DISCONTINUED | OUTPATIENT
Start: 2018-08-15 | End: 2018-08-15 | Stop reason: HOSPADM

## 2018-08-15 RX ORDER — CLINDAMYCIN PHOSPHATE 900 MG/50ML
900 INJECTION, SOLUTION INTRAVENOUS
Status: DISPENSED | OUTPATIENT
Start: 2018-08-15 | End: 2018-08-16

## 2018-08-15 RX ORDER — ROCURONIUM BROMIDE 10 MG/ML
INJECTION, SOLUTION INTRAVENOUS
Status: DISCONTINUED | OUTPATIENT
Start: 2018-08-15 | End: 2018-08-15

## 2018-08-15 RX ORDER — ONDANSETRON 2 MG/ML
INJECTION INTRAMUSCULAR; INTRAVENOUS
Status: DISCONTINUED | OUTPATIENT
Start: 2018-08-15 | End: 2018-08-15

## 2018-08-15 RX ADMIN — HYDROMORPHONE HYDROCHLORIDE 0.5 MG: 1 INJECTION, SOLUTION INTRAMUSCULAR; INTRAVENOUS; SUBCUTANEOUS at 11:08

## 2018-08-15 RX ADMIN — ROCURONIUM BROMIDE 20 MG: 10 INJECTION, SOLUTION INTRAVENOUS at 03:08

## 2018-08-15 RX ADMIN — SODIUM CHLORIDE, SODIUM GLUCONATE, SODIUM ACETATE, POTASSIUM CHLORIDE, MAGNESIUM CHLORIDE, SODIUM PHOSPHATE, DIBASIC, AND POTASSIUM PHOSPHATE: .53; .5; .37; .037; .03; .012; .00082 INJECTION, SOLUTION INTRAVENOUS at 02:08

## 2018-08-15 RX ADMIN — GLYCOPYRROLATE 0.2 MG: 0.2 INJECTION, SOLUTION INTRAMUSCULAR; INTRAVENOUS at 02:08

## 2018-08-15 RX ADMIN — SODIUM CHLORIDE: 0.9 INJECTION, SOLUTION INTRAVENOUS at 11:08

## 2018-08-15 RX ADMIN — ROCURONIUM BROMIDE 40 MG: 10 INJECTION, SOLUTION INTRAVENOUS at 01:08

## 2018-08-15 RX ADMIN — ONDANSETRON 4 MG: 2 INJECTION INTRAMUSCULAR; INTRAVENOUS at 02:08

## 2018-08-15 RX ADMIN — LIDOCAINE HYDROCHLORIDE 50 MG: 20 INJECTION, SOLUTION INTRAVENOUS at 01:08

## 2018-08-15 RX ADMIN — EPHEDRINE SULFATE 10 MG: 50 INJECTION, SOLUTION INTRAMUSCULAR; INTRAVENOUS; SUBCUTANEOUS at 02:08

## 2018-08-15 RX ADMIN — CLINDAMYCIN IN 5 PERCENT DEXTROSE 900 MG: 18 INJECTION, SOLUTION INTRAVENOUS at 11:08

## 2018-08-15 RX ADMIN — DEXAMETHASONE SODIUM PHOSPHATE 8 MG: 4 INJECTION, SOLUTION INTRAMUSCULAR; INTRAVENOUS at 02:08

## 2018-08-15 RX ADMIN — ACETAMINOPHEN 1000 MG: 10 INJECTION, SOLUTION INTRAVENOUS at 02:08

## 2018-08-15 RX ADMIN — DEXTROSE MONOHYDRATE, SODIUM CHLORIDE, AND POTASSIUM CHLORIDE: 50; 4.5; 1.49 INJECTION, SOLUTION INTRAVENOUS at 06:08

## 2018-08-15 RX ADMIN — CLINDAMYCIN PHOSPHATE 900 MG: 18 INJECTION, SOLUTION INTRAVENOUS at 02:08

## 2018-08-15 RX ADMIN — GLYCOPYRROLATE 0.6 MG: 0.2 INJECTION, SOLUTION INTRAMUSCULAR; INTRAVENOUS at 05:08

## 2018-08-15 RX ADMIN — ONDANSETRON 8 MG: 2 INJECTION, SOLUTION INTRAMUSCULAR; INTRAVENOUS at 06:08

## 2018-08-15 RX ADMIN — PROPOFOL 200 MG: 10 INJECTION, EMULSION INTRAVENOUS at 01:08

## 2018-08-15 RX ADMIN — NEOSTIGMINE METHYLSULFATE 5 MG: 1 INJECTION INTRAVENOUS at 05:08

## 2018-08-15 RX ADMIN — ONDANSETRON 4 MG: 2 INJECTION INTRAMUSCULAR; INTRAVENOUS at 05:08

## 2018-08-15 RX ADMIN — SODIUM CHLORIDE, SODIUM GLUCONATE, SODIUM ACETATE, POTASSIUM CHLORIDE, MAGNESIUM CHLORIDE, SODIUM PHOSPHATE, DIBASIC, AND POTASSIUM PHOSPHATE: .53; .5; .37; .037; .03; .012; .00082 INJECTION, SOLUTION INTRAVENOUS at 05:08

## 2018-08-15 RX ADMIN — HYDROCODONE BITARTRATE AND ACETAMINOPHEN 15 ML: 7.5; 325 SOLUTION ORAL at 10:08

## 2018-08-15 RX ADMIN — FENTANYL CITRATE 25 MCG: 50 INJECTION, SOLUTION INTRAMUSCULAR; INTRAVENOUS at 05:08

## 2018-08-15 RX ADMIN — ROCURONIUM BROMIDE 20 MG: 10 INJECTION, SOLUTION INTRAVENOUS at 04:08

## 2018-08-15 RX ADMIN — GLYCOPYRROLATE 0.2 MG: 0.2 INJECTION, SOLUTION INTRAMUSCULAR; INTRAVENOUS at 05:08

## 2018-08-15 RX ADMIN — MIDAZOLAM HYDROCHLORIDE 1 MG: 1 INJECTION, SOLUTION INTRAMUSCULAR; INTRAVENOUS at 01:08

## 2018-08-15 RX ADMIN — FENTANYL CITRATE 100 MCG: 50 INJECTION, SOLUTION INTRAMUSCULAR; INTRAVENOUS at 01:08

## 2018-08-15 RX ADMIN — HYDROMORPHONE HYDROCHLORIDE 0.2 MG: 1 INJECTION, SOLUTION INTRAMUSCULAR; INTRAVENOUS; SUBCUTANEOUS at 08:08

## 2018-08-15 NOTE — INTERVAL H&P NOTE
The patient has been examined and the H&P has been reviewed:    I concur with the findings and no changes have occurred since H&P was written.    Anesthesia/Surgery risks, benefits and alternative options discussed and understood by patient/family.          Active Hospital Problems    Diagnosis  POA    Atrial fibrillation [I48.91]  Yes      Resolved Hospital Problems   No resolved problems to display.     No changes  I have seen and examined the patient. There are no changes to the plan as documented in the previous note(s). Informed consent has been obtained.

## 2018-08-15 NOTE — BRIEF OP NOTE
[facility]  Brief Operative Note    SUMMARY     Surgery Date: 8/15/2018     Surgeon(s) and Role:     * Andrea Price MD - Primary     * Kimberley Eden MD - Resident - Assisting        Pre-op Diagnosis:  Squamous cell carcinoma of left tonsil [C09.9]    Post-op Diagnosis:  Post-Op Diagnosis Codes:     * Squamous cell carcinoma of left tonsil [C09.9]    Procedure(s) (LRB):  DISSECTION, NECK (Left)  TONSILLECTOMY RADICAL (Left)    Anesthesia: General    Description of Procedure: left radical tonsillectomy; left neck dissection levels IB; II-IV    Description of the findings of the procedure: see full op note for details    Estimated Blood Loss: 30mL         Specimens:   Specimen (12h ago, onward)    Start     Ordered    08/15/18 1749  Specimen to Pathology - Surgery  Once     Comments:  1.LEFT RADICAL TONSIL-STITCH ANTERIOR\SUPERIOR INK-TONGUE BASE-FROZEN TO LAB 2. LEFT NECK DISSECTION LEVEL 2A-PERMANENT, IN FORMALIN, PLACED IN REFRIGERATOR3.LEFT NECK DISSECTION LEVEL 2B-PERMANENT, IN FORMALIN, PLACED IN REFRIGERATOR4.LEFT NECK DISSECTION LEVEL 3-PERMANENT, IN FORMALIN, PLACED IN REFRIGERATOR5.LEFT NECK DISSECTION LEVEL 4-PERMANENT, IN FORMALIN, PLACED IN REFRIGERATOR     Start Status   08/15/18 1749 Collected (08/15/18 1751)       08/15/18 1751        As above  I was present for and participated in all aspects of this operation.

## 2018-08-15 NOTE — TRANSFER OF CARE
Anesthesia Transfer of Care Note    Patient: Austyn Muñoz    Procedure(s) Performed: Procedure(s) (LRB):  DISSECTION, NECK (Left)  TONSILLECTOMY RADICAL (Left)    Anesthesia Type: general    Transport from OR: Transported from OR on 6-10 L/min O2 by face mask with adequate spontaneous ventilation    Post pain: adequate analgesia    Post assessment: no apparent anesthetic complications    Post vital signs: stable    Level of consciousness: awake    Nausea/Vomiting: no nausea/vomiting    Complications: none    Transfer of care protocol was followed      Last vitals:   Visit Vitals  /76 (BP Location: Left arm, Patient Position: Lying)   Pulse (!) 52   Temp 36.7 °C (98 °F) (Oral)   Resp 18   Ht 6' (1.829 m)   Wt 104.3 kg (230 lb)   SpO2 97%   BMI 31.19 kg/m²

## 2018-08-15 NOTE — PROGRESS NOTES
Pt updated on projected surgery start time. Pt states Dr Price was recently at bedside. Pt still needs surgery consent, site betty and h&p update.

## 2018-08-15 NOTE — OP NOTE
Date of Operation: 08/15/2018    Surgeon: FLOYD THOMPSON     Assistants: Kimberley Eden (RES)    Anesthesia: General endotracheal anesthesia    ASA Class: 2    Preoperative Diagnosis:   1) cT1N0 SCC left tonsil    Postoperative diagnosis:  1) cT1N0 SCC left tonsil    Procedures performed:  1) Left radical tonsillectomy with partial glossectomy (5%)  2) Left modified neck dissection, level Ib-IV    Closing Set: No    Indications for operation:  Austyn Muñoz is a 62 y.o. male who was referred to the [facility] by Dr. Munoz for p16+ SCC of the left tonsil that was incidentally detected following tonsillectomy for PET-avid tonsil tissue (bilaterally) in the setting of prior follicular lymphoma. Based on this history, I recommended a re-excision of the primary site, essentially performing the same operation he would have gotten if the tonsil cancer was clinically evident, along with a modified neck dissection.     The risks, benefits, indications, and alternatives to this procedure were thoroughly discussed with the patient preoperatively, and informed consent was obtained. Please see my detailed preoperative notes for a thorough account of this discussion.    Findings: margins clear at the primary site. CN VII (marginal mandibular nerve), CN X, CN XI, CN XII identified and preserved. No intraoperative chyle leak. Ansa cervicalis preserved.    EBL: 30mL    IVF:  May be found in the operative record    Detailed Account of Technique Employed:    The patient was brought into the operating room and placed supine on the operating table. The patient was intubated transorally by the anesthesiology service, and an adequate level of general endotracheal anesthesia was obtained. The patient's face was prepped and draped in the standard, sterile fashion. A timeout was performed according to the universal protocol.     The Gerber-Marvin retractor was then inserted in the patient's oral cavity and used to provide  adequate exposure of the oropharynx tumor site and left tongue base at the insertion of the anterior and posterior tonsil pillars. The left tonsil fossa tissue was grasped with a Debakey and then tonsil tenaculum and retracted medially. 1cm margins were marked around the tonsil pillars, and dissection proceeded with the Bovie through the mucosa, scar, and the superior constrictor. The styloglossus was noted, as was the glossopharyngeal nerve. Distal branches of the facial and lingual nerves were controlled with small clips or the bipolar. A radical tonsillectomy including the pillars and about 5% of the posterior tongue/tongue base were removed - hemostasis was accomplished with the bipolar and Surgiflo placed in the fossa. The retractor was relaxed and then re-expanded several times to ensure hemostasis.     A suitable skin crease was identified in the mid-lower neck around an existing left neck scar, marked, and injected with 1% lidocaine with 1: 100,000 epinephrine. The neck incision was made, and the skin and subcutaneous tissues were incised with a 15-blade. Subplatysmal flaps were elevated over the mandible superiorly, and to the clavicles inferiorly    We transitioned our dissection into the anterior limit of the neck.  The fascia underneath the submandibular gland was divided with a #15 blade and elevated over the gland, identifying and preserving multiple branches of the marginal mandibular nerve in the process.  The facial artery and vein were identified at the facial notch and dissected proximally to the takeoff of the submental artery and vein. The vessels were skeletonized, and all fibrofatty, micaela bearing tissue was exenterated, allowing dissection of level Ib nodes without removing the gland itself. The common facial vein was divided, and dissection proceeded along the posterior belly of the digastric both anteriorly and posteriorly.    Dissection then proceeded along the SCM, dividing the fascia but  preserving the external jugular vein and Greater auricular nerve and connecting to the digastric while removing a small amount of the tail of parotid. The fibrofatty, micaela bearing tissue was dissected off the SCM medially, and the segmental blood supply was cauterized and divided. The proximal aspect of the spinal accessory nerve was then identified and this was dissected superiorly to the point where it transitioned lateral to the internal jugular vein but deep to the posterior belly of digastric.  The lateral border of the internal jugular vein was clarified at the transverse process of C1.  The proximal spinal accessory nerve was skeletonized and the contents were exenterated off of the SCM in the floor of the neck.  This was dunked under CN XI and then brought forward with the remainder of the neck dissection specimen.  We continued medially along the SCM.  We then identified the cervical rootlets, preserving the cervical contribution to the spinal accessory nerve.  As we moved inferiorly, we preserved these rootlets distally and maintained our plane of dissection superficial to the deep cervical fascia with visual identification and preservation of the phrenic nerve.  The omohyoid muscle was retracted inferiorly.  The inferior limit of the internal jugular vein was clarified bluntly, and dissection then proceeded laterally to connect with the posterior limit of the dissection. Moving from medial to lateral, the intervening tissues were doubly clamped, divided, and ligated with 2-0 silk.  The carotid sheath was opened and the fibrofatty contents of levels II-IV elevated off of the carotid, vagus, and internal jugular vein in sequence. Multiple branches of the external carotid system were similarly identified and preserved. Clips were placed on the lingual artery. The neck dissection specimen was amputated from the strap muscles and then cut into levels ex vivo to be sent for permanent pathologic analysis.  Multiple valsalva maneuvers were performed, and there was no evidence of chylous drainage at the base of the neck.    The wound was copiously irrigated with saline, and neck wound was closed with interrupted 3-0 vicryl in the platysma, 4-0 monocryl in the deep dermal layer, and dermabond on the skin. One 10 [place] drain was placed in the wound prior to closure, and it was secured and connected to bulb suction.     The patient was allowed to awaken from anesthesia, extubated, and taken to the PACU in stable condition.     All sponge, needle, and instrument counts were correct at the end of the procedure x 2.     I was present for and performed all portions of the operation as noted above.

## 2018-08-16 VITALS
OXYGEN SATURATION: 97 % | HEART RATE: 82 BPM | RESPIRATION RATE: 18 BRPM | HEIGHT: 72 IN | TEMPERATURE: 99 F | BODY MASS INDEX: 32.13 KG/M2 | WEIGHT: 237.19 LBS | SYSTOLIC BLOOD PRESSURE: 143 MMHG | DIASTOLIC BLOOD PRESSURE: 67 MMHG

## 2018-08-16 LAB
ANION GAP SERPL CALC-SCNC: 10 MMOL/L
BASOPHILS # BLD AUTO: 0.02 K/UL
BASOPHILS NFR BLD: 0.1 %
BUN SERPL-MCNC: 13 MG/DL
CALCIUM SERPL-MCNC: 8.4 MG/DL
CHLORIDE SERPL-SCNC: 105 MMOL/L
CO2 SERPL-SCNC: 22 MMOL/L
CREAT SERPL-MCNC: 1 MG/DL
DIFFERENTIAL METHOD: ABNORMAL
EOSINOPHIL # BLD AUTO: 0 K/UL
EOSINOPHIL NFR BLD: 0 %
ERYTHROCYTE [DISTWIDTH] IN BLOOD BY AUTOMATED COUNT: 13.8 %
EST. GFR  (AFRICAN AMERICAN): >60 ML/MIN/1.73 M^2
EST. GFR  (NON AFRICAN AMERICAN): >60 ML/MIN/1.73 M^2
GLUCOSE SERPL-MCNC: 150 MG/DL
HCT VFR BLD AUTO: 37.3 %
HGB BLD-MCNC: 12.5 G/DL
IMM GRANULOCYTES # BLD AUTO: 0.09 K/UL
IMM GRANULOCYTES NFR BLD AUTO: 0.6 %
LYMPHOCYTES # BLD AUTO: 1.2 K/UL
LYMPHOCYTES NFR BLD: 7.2 %
MCH RBC QN AUTO: 32 PG
MCHC RBC AUTO-ENTMCNC: 33.5 G/DL
MCV RBC AUTO: 95 FL
MONOCYTES # BLD AUTO: 1.1 K/UL
MONOCYTES NFR BLD: 6.6 %
NEUTROPHILS # BLD AUTO: 13.8 K/UL
NEUTROPHILS NFR BLD: 85.5 %
NRBC BLD-RTO: 0 /100 WBC
PLATELET # BLD AUTO: 166 K/UL
PMV BLD AUTO: 10.9 FL
POTASSIUM SERPL-SCNC: 4.4 MMOL/L
RBC # BLD AUTO: 3.91 M/UL
SODIUM SERPL-SCNC: 137 MMOL/L
WBC # BLD AUTO: 16.09 K/UL

## 2018-08-16 PROCEDURE — 51798 US URINE CAPACITY MEASURE: CPT

## 2018-08-16 PROCEDURE — 25000003 PHARM REV CODE 250: Performed by: OTOLARYNGOLOGY

## 2018-08-16 PROCEDURE — 36415 COLL VENOUS BLD VENIPUNCTURE: CPT

## 2018-08-16 PROCEDURE — 85025 COMPLETE CBC W/AUTO DIFF WBC: CPT

## 2018-08-16 PROCEDURE — 80048 BASIC METABOLIC PNL TOTAL CA: CPT

## 2018-08-16 RX ORDER — ONDANSETRON 4 MG/1
4 TABLET, ORALLY DISINTEGRATING ORAL EVERY 6 HOURS PRN
Qty: 20 TABLET | Refills: 0 | Status: SHIPPED | OUTPATIENT
Start: 2018-08-16

## 2018-08-16 RX ORDER — CLINDAMYCIN HYDROCHLORIDE 300 MG/1
300 CAPSULE ORAL EVERY 6 HOURS
Qty: 20 CAPSULE | Refills: 0 | Status: ON HOLD | OUTPATIENT
Start: 2018-08-16 | End: 2018-08-17

## 2018-08-16 RX ORDER — HYDROCODONE BITARTRATE AND ACETAMINOPHEN 7.5; 325 MG/1; MG/1
1 TABLET ORAL EVERY 4 HOURS PRN
Qty: 60 TABLET | Refills: 0 | Status: SHIPPED | OUTPATIENT
Start: 2018-08-16

## 2018-08-16 RX ORDER — HYDROCODONE BITARTRATE AND ACETAMINOPHEN 7.5; 325 MG/15ML; MG/15ML
15 SOLUTION ORAL EVERY 6 HOURS PRN
Qty: 473 ML | Refills: 0 | Status: SHIPPED | OUTPATIENT
Start: 2018-08-16 | End: 2018-08-16 | Stop reason: HOSPADM

## 2018-08-16 RX ORDER — ONDANSETRON 8 MG/1
8 TABLET, ORALLY DISINTEGRATING ORAL EVERY 12 HOURS PRN
Qty: 20 TABLET | Refills: 0 | Status: SHIPPED | OUTPATIENT
Start: 2018-08-16 | End: 2018-08-17

## 2018-08-16 RX ORDER — CLINDAMYCIN HYDROCHLORIDE 300 MG/1
300 CAPSULE ORAL EVERY 6 HOURS
Qty: 28 CAPSULE | Refills: 0 | Status: ON HOLD | OUTPATIENT
Start: 2018-08-16 | End: 2018-08-17

## 2018-08-16 RX ADMIN — ROSUVASTATIN CALCIUM 10 MG: 10 TABLET, FILM COATED ORAL at 09:08

## 2018-08-16 RX ADMIN — HYDROCODONE BITARTRATE AND ACETAMINOPHEN 15 ML: 7.5; 325 SOLUTION ORAL at 03:08

## 2018-08-16 RX ADMIN — HYDROCODONE BITARTRATE AND ACETAMINOPHEN 15 ML: 7.5; 325 SOLUTION ORAL at 09:08

## 2018-08-16 RX ADMIN — HYDROCODONE BITARTRATE AND ACETAMINOPHEN 15 ML: 7.5; 325 SOLUTION ORAL at 02:08

## 2018-08-16 RX ADMIN — ATENOLOL 50 MG: 50 TABLET ORAL at 07:08

## 2018-08-16 RX ADMIN — DEXTROSE MONOHYDRATE, SODIUM CHLORIDE, AND POTASSIUM CHLORIDE: 50; 4.5; 1.49 INJECTION, SOLUTION INTRAVENOUS at 03:08

## 2018-08-16 RX ADMIN — CANDESARTAN CILEXETIL 8 MG: 8 TABLET ORAL at 01:08

## 2018-08-16 RX ADMIN — PROMETHAZINE HYDROCHLORIDE 12.5 MG: 6.25 SOLUTION ORAL at 02:08

## 2018-08-16 NOTE — NURSING
Bladder scanned for no void 6 hours post op. 123ml scanned. Educated on PO H2O and attempting to void within the next 2 hours, pt stated understanding. Will cont to manage POC.

## 2018-08-16 NOTE — PROGRESS NOTES
Pt resting quietly,VSS,resp unlabored, incision and drain intact, pt remains confused and requires frequent re-orintation, attempted to update wife Erin,message left on phone.

## 2018-08-16 NOTE — PLAN OF CARE
[facility] discharged to [facility] today, instructed to follow up in ENT clinic tomorrow.       08/16/18 1553   Final Note   Assessment Type Final Discharge Note   Discharge Disposition Home   Right Care Referral Info   Post Acute Recommendation No Care

## 2018-08-16 NOTE — PROGRESS NOTES
Pt AA&Ox4,VSS,resp unlabored, incision and drain intact, spouse and brother at bedside, report called to Holly NOYOLA,pt made ready for transport to [facility] with tele, via stretcher per PCT, stable at present.

## 2018-08-16 NOTE — PROGRESS NOTES
Wife and brother in law at bedside, pt remains confused but appears to be improving, remains physically stable.

## 2018-08-16 NOTE — NURSING TRANSFER
Nursing Transfer Note      8/16/2018     Transfer to 640    Transfer via stretcher    Transfer with son and spouse    Chart send with patient: Yes    Notified: MD aware    Patient reassessed at: 2115    Upon arrival to floor: PT ambulated to Bed, standby assist. Oriented to , will cont to manage POC.

## 2018-08-16 NOTE — ANESTHESIA POSTPROCEDURE EVALUATION
Anesthesia Post Evaluation    Patient: Austyn Muñoz    Procedure(s) Performed: Procedure(s) (LRB):  DISSECTION, NECK (Left)  TONSILLECTOMY RADICAL (Left)    Final Anesthesia Type: general  Patient location during evaluation: PACU  Patient participation: Yes- Able to Participate  Level of consciousness: awake and alert  Post-procedure vital signs: reviewed and stable  Pain management: adequate  Airway patency: patent  PONV status at discharge: No PONV  Anesthetic complications: no      Cardiovascular status: blood pressure returned to baseline  Respiratory status: unassisted  Hydration status: euvolemic  Follow-up not needed.        Visit Vitals  BP (!) 158/83 (Patient Position: Lying)   Pulse 72   Temp 36.4 °C (97.6 °F) (Oral)   Resp 20   Ht 6' (1.829 m)   Wt 107.6 kg (237 lb 3.4 oz)   SpO2 98%   BMI 32.17 kg/m²       Pain/Freddy Score: Pain Assessment Performed: Yes (8/15/2018  8:00 PM)  Presence of Pain: denies (8/15/2018  8:00 PM)  Freddy Score: 10 (8/15/2018  9:00 PM)

## 2018-08-16 NOTE — PLAN OF CARE
Patient is a 62 year old male admitted from home and underwent Left Radical Tonsillectomy with partial Glossectomy, Left Modified Neck Dissection 8/15/2018.  Patient is expected to discharge to the [facility] today and return to Dr Price's clinic tomorrow 9/17/2018.    Patient lives in a one story home with his wife, Erin, who will drive him home, care for him and obtain anything he needs after discharge.  Patient given [facility] with understanding verbalized.  Will continue to follow for needs.    PCP  Chung Mancilla MD  [address] [facility]  [phone]      [id number] - [place], [place] - [address]  [address]  [place] [address]  Phone: [phone] Fax: [phone]      Extended Emergency Contact Information  Primary Emergency Contact: Erin Muñoz  Address: [address] [place] [address]  Home Phone: [phone]  Mobile Phone: [phone]  Relation: Spouse       08/16/18 1542   Discharge Assessment   Assessment Type Discharge Planning Assessment   Confirmed/corrected address and phone number on facesheet? Yes   Assessment information obtained from? Patient   Expected Length of Stay (days) 2   Communicated expected length of stay with patient/caregiver yes   Prior to hospitilization cognitive status: Alert/Oriented   Prior to hospitalization functional status: Independent   Current cognitive status: Alert/Oriented   Current Functional Status: Independent   Lives With spouse   Able to Return to Prior Arrangements yes   Is patient able to care for self after discharge? Yes   Patient's perception of discharge disposition home or selfcare   Equipment Currently Used at Home none   Do you have any problems affording any of your prescribed medications? No   Is the patient taking medications as prescribed? yes   Does the patient have transportation home? Yes   Transportation  Available family or friend will provide   Discharge Plan A Home with family   Discharge Plan B Home with family;Home Health   Patient/Family In Agreement With Plan yes

## 2018-08-17 ENCOUNTER — HOSPITAL ENCOUNTER (INPATIENT)
Facility: HOSPITAL | Age: 63
LOS: 3 days | Discharge: HOME | DRG: 641 | End: 2018-08-20
Attending: OTOLARYNGOLOGY | Admitting: OTOLARYNGOLOGY
Payer: COMMERCIAL

## 2018-08-17 ENCOUNTER — INFUSION (OUTPATIENT)
Dept: INFUSION THERAPY | Facility: HOSPITAL | Age: 63
End: 2018-08-17
Attending: OTOLARYNGOLOGY
Payer: COMMERCIAL

## 2018-08-17 ENCOUNTER — OFFICE VISIT (OUTPATIENT)
Dept: HEMATOLOGY/ONCOLOGY | Facility: CLINIC | Age: 63
End: 2018-08-17
Payer: COMMERCIAL

## 2018-08-17 VITALS
WEIGHT: 237 LBS | HEIGHT: 72 IN | BODY MASS INDEX: 32.1 KG/M2 | TEMPERATURE: 100 F | SYSTOLIC BLOOD PRESSURE: 126 MMHG | DIASTOLIC BLOOD PRESSURE: 74 MMHG | HEART RATE: 61 BPM

## 2018-08-17 VITALS
HEART RATE: 67 BPM | DIASTOLIC BLOOD PRESSURE: 72 MMHG | SYSTOLIC BLOOD PRESSURE: 147 MMHG | TEMPERATURE: 99 F | RESPIRATION RATE: 20 BRPM

## 2018-08-17 DIAGNOSIS — R52 INTRACTABLE PAIN: ICD-10-CM

## 2018-08-17 DIAGNOSIS — E86.0 DEHYDRATION: Primary | ICD-10-CM

## 2018-08-17 DIAGNOSIS — C09.9 SQUAMOUS CELL CARCINOMA OF LEFT TONSIL: Primary | ICD-10-CM

## 2018-08-17 DIAGNOSIS — E86.0 DEHYDRATION: ICD-10-CM

## 2018-08-17 DIAGNOSIS — R07.9 CHEST PAIN: ICD-10-CM

## 2018-08-17 DIAGNOSIS — D64.9 ANEMIA, UNSPECIFIED TYPE: ICD-10-CM

## 2018-08-17 DIAGNOSIS — C82.21 GRADE 3 FOLLICULAR LYMPHOMA OF LYMPH NODES OF NECK: ICD-10-CM

## 2018-08-17 DIAGNOSIS — R11.0 NAUSEA: ICD-10-CM

## 2018-08-17 PROCEDURE — 3008F BODY MASS INDEX DOCD: CPT | Mod: CPTII,S$GLB,, | Performed by: INTERNAL MEDICINE

## 2018-08-17 PROCEDURE — 25000003 PHARM REV CODE 250: Performed by: OTOLARYNGOLOGY

## 2018-08-17 PROCEDURE — 25000003 PHARM REV CODE 250: Performed by: NURSE PRACTITIONER

## 2018-08-17 PROCEDURE — 96361 HYDRATE IV INFUSION ADD-ON: CPT

## 2018-08-17 PROCEDURE — 96367 TX/PROPH/DG ADDL SEQ IV INF: CPT

## 2018-08-17 PROCEDURE — 99999 PR PBB SHADOW E&M-EST. PATIENT-LVL IV: CPT | Mod: PBBFAC,,,

## 2018-08-17 PROCEDURE — 63600175 PHARM REV CODE 636 W HCPCS: Performed by: NURSE PRACTITIONER

## 2018-08-17 PROCEDURE — 99205 OFFICE O/P NEW HI 60 MIN: CPT | Mod: S$GLB,,, | Performed by: INTERNAL MEDICINE

## 2018-08-17 PROCEDURE — 96360 HYDRATION IV INFUSION INIT: CPT

## 2018-08-17 PROCEDURE — 63600175 PHARM REV CODE 636 W HCPCS: Performed by: OTOLARYNGOLOGY

## 2018-08-17 PROCEDURE — 11000001 HC ACUTE MED/SURG PRIVATE ROOM

## 2018-08-17 RX ORDER — DEXTROSE MONOHYDRATE, SODIUM CHLORIDE, AND POTASSIUM CHLORIDE 50; 1.49; 4.5 G/1000ML; G/1000ML; G/1000ML
INJECTION, SOLUTION INTRAVENOUS CONTINUOUS
Status: DISCONTINUED | OUTPATIENT
Start: 2018-08-17 | End: 2018-08-18

## 2018-08-17 RX ORDER — RAMELTEON 8 MG/1
8 TABLET ORAL NIGHTLY PRN
Status: DISCONTINUED | OUTPATIENT
Start: 2018-08-17 | End: 2018-08-20 | Stop reason: HOSPADM

## 2018-08-17 RX ORDER — IBUPROFEN 600 MG/1
600 TABLET ORAL EVERY 6 HOURS PRN
Status: DISCONTINUED | OUTPATIENT
Start: 2018-08-17 | End: 2018-08-20 | Stop reason: HOSPADM

## 2018-08-17 RX ORDER — DIPHENHYDRAMINE HYDROCHLORIDE 50 MG/ML
25 INJECTION INTRAMUSCULAR; INTRAVENOUS EVERY 4 HOURS PRN
Status: DISCONTINUED | OUTPATIENT
Start: 2018-08-17 | End: 2018-08-20 | Stop reason: HOSPADM

## 2018-08-17 RX ORDER — CLINDAMYCIN HYDROCHLORIDE 150 MG/1
300 CAPSULE ORAL EVERY 8 HOURS
Status: DISCONTINUED | OUTPATIENT
Start: 2018-08-17 | End: 2018-08-20 | Stop reason: HOSPADM

## 2018-08-17 RX ORDER — HYDROCODONE BITARTRATE AND ACETAMINOPHEN 5; 325 MG/1; MG/1
1 TABLET ORAL EVERY 4 HOURS PRN
Status: DISCONTINUED | OUTPATIENT
Start: 2018-08-17 | End: 2018-08-20 | Stop reason: HOSPADM

## 2018-08-17 RX ORDER — OXYCODONE AND ACETAMINOPHEN 5; 325 MG/1; MG/1
2 TABLET ORAL ONCE
Status: COMPLETED | OUTPATIENT
Start: 2018-08-17 | End: 2018-08-17

## 2018-08-17 RX ORDER — ENOXAPARIN SODIUM 100 MG/ML
40 INJECTION SUBCUTANEOUS EVERY 24 HOURS
Status: DISCONTINUED | OUTPATIENT
Start: 2018-08-18 | End: 2018-08-20 | Stop reason: HOSPADM

## 2018-08-17 RX ORDER — ACETAMINOPHEN 325 MG/1
650 TABLET ORAL EVERY 8 HOURS PRN
Status: DISCONTINUED | OUTPATIENT
Start: 2018-08-17 | End: 2018-08-20 | Stop reason: HOSPADM

## 2018-08-17 RX ORDER — SODIUM CHLORIDE 9 MG/ML
INJECTION, SOLUTION INTRAVENOUS CONTINUOUS
Status: DISCONTINUED | OUTPATIENT
Start: 2018-08-17 | End: 2018-08-17 | Stop reason: HOSPADM

## 2018-08-17 RX ORDER — MORPHINE SULFATE 4 MG/ML
4 INJECTION, SOLUTION INTRAMUSCULAR; INTRAVENOUS EVERY 4 HOURS PRN
Status: DISCONTINUED | OUTPATIENT
Start: 2018-08-17 | End: 2018-08-20 | Stop reason: HOSPADM

## 2018-08-17 RX ORDER — OXYCODONE AND ACETAMINOPHEN 5; 325 MG/1; MG/1
2 TABLET ORAL ONCE
Status: CANCELLED
Start: 2018-08-17 | End: 2018-08-17

## 2018-08-17 RX ORDER — ONDANSETRON 8 MG/1
8 TABLET, ORALLY DISINTEGRATING ORAL EVERY 8 HOURS PRN
Status: DISCONTINUED | OUTPATIENT
Start: 2018-08-17 | End: 2018-08-20 | Stop reason: HOSPADM

## 2018-08-17 RX ORDER — HYDROMORPHONE HYDROCHLORIDE 2 MG/ML
1 INJECTION, SOLUTION INTRAMUSCULAR; INTRAVENOUS; SUBCUTANEOUS ONCE
Status: CANCELLED
Start: 2018-08-17 | End: 2018-08-17

## 2018-08-17 RX ORDER — ATENOLOL 25 MG/1
50 TABLET ORAL EVERY MORNING
Status: DISCONTINUED | OUTPATIENT
Start: 2018-08-18 | End: 2018-08-20 | Stop reason: HOSPADM

## 2018-08-17 RX ADMIN — MORPHINE SULFATE 4 MG: 4 INJECTION, SOLUTION INTRAMUSCULAR; INTRAVENOUS at 03:08

## 2018-08-17 RX ADMIN — ONDANSETRON 4 MG: 2 INJECTION, SOLUTION INTRAMUSCULAR; INTRAVENOUS at 11:08

## 2018-08-17 RX ADMIN — OXYCODONE HYDROCHLORIDE AND ACETAMINOPHEN 1 TABLET: 5; 325 TABLET ORAL at 11:08

## 2018-08-17 RX ADMIN — SODIUM CHLORIDE: 0.9 INJECTION, SOLUTION INTRAVENOUS at 11:08

## 2018-08-17 RX ADMIN — HYDROCODONE BITARTRATE AND ACETAMINOPHEN 1 TABLET: 5; 325 TABLET ORAL at 05:08

## 2018-08-17 RX ADMIN — SODIUM CHLORIDE 1000 ML: 0.9 INJECTION, SOLUTION INTRAVENOUS at 01:08

## 2018-08-17 RX ADMIN — HYDROCODONE BITARTRATE AND ACETAMINOPHEN 1 TABLET: 5; 325 TABLET ORAL at 09:08

## 2018-08-17 RX ADMIN — DEXTROSE MONOHYDRATE, SODIUM CHLORIDE, AND POTASSIUM CHLORIDE: 50; 4.5; 1.49 INJECTION, SOLUTION INTRAVENOUS at 03:08

## 2018-08-17 RX ADMIN — DEXTROSE MONOHYDRATE, SODIUM CHLORIDE, AND POTASSIUM CHLORIDE: 50; 4.5; 1.49 INJECTION, SOLUTION INTRAVENOUS at 11:08

## 2018-08-17 RX ADMIN — CLINDAMYCIN HYDROCHLORIDE 300 MG: 150 CAPSULE ORAL at 09:08

## 2018-08-17 NOTE — PROGRESS NOTES
Felicia Martinez RN (ENT nurse) notified of pt's arrival to 11th floor.  States Dr. Lake is in surgery and will notify him of pt's arrival to 11th floor.

## 2018-08-17 NOTE — PROGRESS NOTES
PATIENT: Austyn Muñoz  MRN: [id number]  DATE: 8/17/2018    Subjective:     Chief complaint:  Chief Complaint   Patient presents with    Dehydration       Oncologic History:   Austyn Muñoz is a 62 y.o. male who was referred to the [facility] by Dr. Munoz for p16+ SCC of the left tonsil that was incidentally detected following tonsillectomy for PET-avid tonsil tissue (bilaterally) in the setting of prior follicular lymphoma. He is s/p Left radical tonsillectomy with partial glossectomy (5%) and Left modified neck dissection, level Ib-IV on 8/15/18.     Interval History: Report from Lexie Lazar RN with Dr. Price's office-patient presented to there office for possible drain removal. He was found to be dehydrated and requires admission. She reports that he did not have labs today. He was sent here to urgent care to be seen for IV hydration prior to admission in attempts to stabilize and avoid ED visit. Dr. Price will be admitting and orders will be placed from their office.   Patient was discharged from hospital yesterday post his surgery as stated above. Today, he reports not eating or drinking. He feels weak and dizzy. He also reports headaches since the surgery. Wife reports patient looked flushed yesterday but did not have a fever. He reports SOB when lying flat since being discharged. Coughing helps with his breathing. He produces thick bloody mucus at times with this cough. No swelling.  No documented fever today until here in clinic (100.F). He reports feeling cold today but no chills. He is nauseated at this time. No vomiting. He took Zofran 4mg at 830 AM. He is reporting pain to the surgical site a 6/10 and  8/10 in throat. He last took Norco at 2am. He started clindamycin this morning as prescribed. He was having diarrhea prior to surgery but now reports constipation.   He is accompanied by his wife.     ECOG Performance Status:   ECOG SCORE    2 - Capable of all selfcare but unable to carry  out any work activities, active > 50% of hours         PMFSH: all information reviewed and updated as relevant to today's visit    Review of Systems:   Review of Systems   Constitutional: Positive for activity change, appetite change, fatigue and fever.   HENT: Positive for sore throat. Negative for mouth sores and nosebleeds.    Eyes: Negative for visual disturbance.   Respiratory: Positive for cough and shortness of breath.    Cardiovascular: Negative for chest pain, palpitations and leg swelling.   Gastrointestinal: Positive for constipation and nausea. Negative for abdominal pain, diarrhea and vomiting.   Genitourinary: Negative for difficulty urinating and frequency.   Musculoskeletal: Negative for arthralgias and back pain.   Skin: Negative for rash.   Neurological: Positive for dizziness and headaches. Negative for numbness.   Hematological: Negative for adenopathy. Does not bruise/bleed easily.   Psychiatric/Behavioral: Negative for confusion and sleep disturbance. The patient is not nervous/anxious.    All other systems reviewed and are negative.        Objective:      Vitals:   Vitals:    08/17/18 1026   BP: 126/74   Pulse: 61   Temp: 100.3 °F (37.9 °C)   Weight: 107.5 kg (236 lb 15.9 oz)   Height: 6' (1.829 m)     BMI: Body mass index is 32.14 kg/m².      Physical Exam:   Physical Exam   Constitutional: He is oriented to person, place, and time. He appears well-developed and well-nourished. No distress.   Weak appearing male sitting in chair. Presents with Wife.    HENT:   Right Ear: External ear normal.   Left Ear: External ear normal.   Mouth/Throat: No oropharyngeal exudate.   Unable to assess pharynx due to inability to open mouth due to pain.   Mucus membranes dry   Eyes: Conjunctivae and lids are normal. Pupils are equal, round, and reactive to light. No scleral icterus.   Neck: Trachea normal. Neck supple. No thyromegaly present.   Surgical incision to left neck with drain intact.   ROM limited due  to pain.    Cardiovascular: Normal rate, regular rhythm, normal heart sounds and normal pulses.   Pulmonary/Chest: Effort normal and breath sounds normal.   Very few, faint crackles to bases more so on right. Cough triggered by inhalation. Unlabored.    Abdominal: Soft. Normal appearance and bowel sounds are normal. He exhibits no distension and no mass. There is no hepatosplenomegaly or splenomegaly. There is no tenderness.   Musculoskeletal: Normal range of motion.   Lymphadenopathy:        Head (right side): No submental and no submandibular adenopathy present.        Head (left side): No submental and no submandibular adenopathy present.     He has no cervical adenopathy.     He has no axillary adenopathy.        Right: No supraclavicular adenopathy present.        Left: No supraclavicular adenopathy present.   Neurological: He is alert and oriented to person, place, and time. He has normal reflexes. No sensory deficit.   Skin: Skin is warm, dry and intact. No bruising and no rash noted. Nails show no clubbing.   Flushed; poor turgor   Psychiatric: He has a normal mood and affect. His speech is normal and behavior is normal. Cognition and memory are normal.   Vitals reviewed.        Laboratory Data:  WBC   Date Value Ref Range Status   08/17/2018 14.17 (H) 3.90 - 12.70 K/uL Final     Hemoglobin   Date Value Ref Range Status   08/17/2018 13.1 (L) 14.0 - 18.0 g/dL Final     Hematocrit   Date Value Ref Range Status   08/17/2018 39.8 (L) 40.0 - 54.0 % Final     Platelets   Date Value Ref Range Status   08/17/2018 161 150 - 350 K/uL Final     Gran # (ANC)   Date Value Ref Range Status   08/17/2018 10.8 (H) 1.8 - 7.7 K/uL Final     Comment:     The ANC is based on a white cell differential from an   automated cell counter. It has not been microscopically   reviewed for the presence of abnormal cells. Clinical   correlation is required.         Chemistry        Component Value Date/Time     08/17/2018 1015    K  4.5 08/17/2018 1015     08/17/2018 1015    CO2 29 08/17/2018 1015    BUN 11 08/17/2018 1015    CREATININE 1.0 08/17/2018 1015     08/17/2018 1015        Component Value Date/Time    CALCIUM 9.8 08/17/2018 1015    ALKPHOS 51 (L) 08/17/2018 1015    AST 20 08/17/2018 1015    ALT 21 08/17/2018 1015    BILITOT 2.1 (H) 08/17/2018 1015    ESTGFRAFRICA >60.0 08/17/2018 1015    EGFRNONAA >60.0 08/17/2018 1015              Assessment/Plan:     1. Dehydration    2. Nausea    3. Intractable pain    4. Anemia, unspecified type        Plan:    Patient presents to urgent care from Dr. Price's office to initiate IV hydration prior to being admitted. He is dehydrated, nauseated and is in pain from recent surgery.  Plan for 1 liter NS IV over an hour, zofran 4mg IV and Percocet. (We do not have IV pain medication in infusion and patient reports he is able to swallow pills.) Patient was transported to infusion is stable condition. Status report given to nurse Lexie with question of initiating IV antibiotics but Dr. Stoll wishes to hold off for now. Will monitor.   Once hospital bed is available, he will be transported.    Labs reviewed- will monitor. Anemia parameters slightly improved.           Patient is in agreement with the proposed treatment plan. All questions were answered to the patient's satisfaction. Pt knows to call clinic if anything is needed before the next clinic visit.      LUZMA Lopez  Hematology & Oncology  [address] [place] [id number]  ph. [phone]  Fax. [phone]     60 minutes were spent in coordination of patient's care, record review and counseling. More than 50% of the time was face-to-face.    LUZMA Lopez  Hematology and Medical Oncology  Distress Screening Results: Psychosocial Distress screening score of Distress Score: 5 noted and reviewed. No intervention indicated.

## 2018-08-17 NOTE — NURSING
1100a.  Pt arrived from urgent care, ambulatory to unit.  C/o headache , pain scale 8.  States gets SOB when walking, 02 sats when sitting on room air 96%.  Temp 99.0, see flowsheets for b/p.  Started saline lock in left hand with 22g jelco. NS started at 500cchr.  1120p Medicated with one percocet as ordered per Ana for pain.  Also administered Zofran for nausea as ordered.  1141a  Pt resting quietly at this time.  Waiting for bed to be admitted to hospital.  Will cont to monitor. 1230p VSS. Pt states feels a little better. Willcont to monitor. 1444p.  Report called to Melissa NOYOLA, Pt being taken to 1156a with escort via w/c.

## 2018-08-17 NOTE — PLAN OF CARE
Problem: Patient Care Overview  Goal: Plan of Care Review  Outcome: Ongoing (interventions implemented as appropriate)  Pt admitted to hospital via w/c with escort and accompanied by spoiuse.

## 2018-08-18 PROBLEM — R07.89 CHEST DISCOMFORT: Status: ACTIVE | Noted: 2018-08-18

## 2018-08-18 LAB
ANION GAP SERPL CALC-SCNC: 7 MMOL/L
BASOPHILS # BLD AUTO: 0.07 K/UL
BASOPHILS NFR BLD: 0.6 %
BUN SERPL-MCNC: 10 MG/DL
CALCIUM SERPL-MCNC: 9.6 MG/DL
CHLORIDE SERPL-SCNC: 103 MMOL/L
CO2 SERPL-SCNC: 27 MMOL/L
CREAT SERPL-MCNC: 0.9 MG/DL
DIFFERENTIAL METHOD: ABNORMAL
EOSINOPHIL # BLD AUTO: 0.2 K/UL
EOSINOPHIL NFR BLD: 2 %
ERYTHROCYTE [DISTWIDTH] IN BLOOD BY AUTOMATED COUNT: 13.3 %
EST. GFR  (AFRICAN AMERICAN): >60 ML/MIN/1.73 M^2
EST. GFR  (NON AFRICAN AMERICAN): >60 ML/MIN/1.73 M^2
GLUCOSE SERPL-MCNC: 114 MG/DL
HCT VFR BLD AUTO: 39.9 %
HGB BLD-MCNC: 13.8 G/DL
IMM GRANULOCYTES # BLD AUTO: 0.05 K/UL
IMM GRANULOCYTES NFR BLD AUTO: 0.4 %
LYMPHOCYTES # BLD AUTO: 1.8 K/UL
LYMPHOCYTES NFR BLD: 15.7 %
MCH RBC QN AUTO: 32.7 PG
MCHC RBC AUTO-ENTMCNC: 34.6 G/DL
MCV RBC AUTO: 95 FL
MONOCYTES # BLD AUTO: 1.2 K/UL
MONOCYTES NFR BLD: 10.4 %
NEUTROPHILS # BLD AUTO: 8 K/UL
NEUTROPHILS NFR BLD: 70.9 %
NRBC BLD-RTO: 0 /100 WBC
PLATELET # BLD AUTO: 165 K/UL
PMV BLD AUTO: 11.2 FL
POTASSIUM SERPL-SCNC: 4.1 MMOL/L
RBC # BLD AUTO: 4.22 M/UL
SODIUM SERPL-SCNC: 137 MMOL/L
TROPONIN I SERPL DL<=0.01 NG/ML-MCNC: 0.01 NG/ML
WBC # BLD AUTO: 11.32 K/UL

## 2018-08-18 PROCEDURE — 25500020 PHARM REV CODE 255: Performed by: OTOLARYNGOLOGY

## 2018-08-18 PROCEDURE — 36415 COLL VENOUS BLD VENIPUNCTURE: CPT

## 2018-08-18 PROCEDURE — 93005 ELECTROCARDIOGRAM TRACING: CPT

## 2018-08-18 PROCEDURE — 80048 BASIC METABOLIC PNL TOTAL CA: CPT

## 2018-08-18 PROCEDURE — 05HY33Z INSERTION OF INFUSION DEVICE INTO UPPER VEIN, PERCUTANEOUS APPROACH: ICD-10-PCS | Performed by: OTOLARYNGOLOGY

## 2018-08-18 PROCEDURE — 25000003 PHARM REV CODE 250: Performed by: OTOLARYNGOLOGY

## 2018-08-18 PROCEDURE — 85025 COMPLETE CBC W/AUTO DIFF WBC: CPT

## 2018-08-18 PROCEDURE — 84484 ASSAY OF TROPONIN QUANT: CPT

## 2018-08-18 PROCEDURE — 11000001 HC ACUTE MED/SURG PRIVATE ROOM

## 2018-08-18 PROCEDURE — 93010 ELECTROCARDIOGRAM REPORT: CPT | Mod: ,,, | Performed by: INTERNAL MEDICINE

## 2018-08-18 PROCEDURE — 63600175 PHARM REV CODE 636 W HCPCS: Performed by: OTOLARYNGOLOGY

## 2018-08-18 RX ORDER — DEXTROSE MONOHYDRATE, SODIUM CHLORIDE, AND POTASSIUM CHLORIDE 50; 1.49; 4.5 G/1000ML; G/1000ML; G/1000ML
INJECTION, SOLUTION INTRAVENOUS CONTINUOUS
Status: DISCONTINUED | OUTPATIENT
Start: 2018-08-18 | End: 2018-08-19

## 2018-08-18 RX ORDER — FUROSEMIDE 10 MG/ML
10 INJECTION INTRAMUSCULAR; INTRAVENOUS ONCE
Status: COMPLETED | OUTPATIENT
Start: 2018-08-18 | End: 2018-08-18

## 2018-08-18 RX ORDER — FUROSEMIDE 10 MG/ML
20 INJECTION INTRAMUSCULAR; INTRAVENOUS ONCE
Status: DISCONTINUED | OUTPATIENT
Start: 2018-08-18 | End: 2018-08-18

## 2018-08-18 RX ADMIN — FUROSEMIDE 10 MG: 10 INJECTION, SOLUTION INTRAMUSCULAR; INTRAVENOUS at 09:08

## 2018-08-18 RX ADMIN — ATENOLOL 50 MG: 25 TABLET ORAL at 06:08

## 2018-08-18 RX ADMIN — DEXTROSE MONOHYDRATE, SODIUM CHLORIDE, AND POTASSIUM CHLORIDE: 50; 4.5; 1.49 INJECTION, SOLUTION INTRAVENOUS at 05:08

## 2018-08-18 RX ADMIN — CLINDAMYCIN HYDROCHLORIDE 300 MG: 150 CAPSULE ORAL at 08:08

## 2018-08-18 RX ADMIN — HYDROCODONE BITARTRATE AND ACETAMINOPHEN 1 TABLET: 5; 325 TABLET ORAL at 03:08

## 2018-08-18 RX ADMIN — HYDROCODONE BITARTRATE AND ACETAMINOPHEN 1 TABLET: 5; 325 TABLET ORAL at 09:08

## 2018-08-18 RX ADMIN — CLINDAMYCIN HYDROCHLORIDE 300 MG: 150 CAPSULE ORAL at 06:08

## 2018-08-18 RX ADMIN — CLINDAMYCIN HYDROCHLORIDE 300 MG: 150 CAPSULE ORAL at 02:08

## 2018-08-18 RX ADMIN — IOHEXOL 100 ML: 350 INJECTION, SOLUTION INTRAVENOUS at 04:08

## 2018-08-18 RX ADMIN — ENOXAPARIN SODIUM 40 MG: 100 INJECTION SUBCUTANEOUS at 05:08

## 2018-08-18 RX ADMIN — HYDROCODONE BITARTRATE AND ACETAMINOPHEN 1 TABLET: 5; 325 TABLET ORAL at 08:08

## 2018-08-18 NOTE — ASSESSMENT & PLAN NOTE
Now s/p radical tonsillectomy and neck dissection on 8/15. Discharged home and developed inability to tolerate PO. Patient admitted for observation and fluid resuscitation.     - encourage PO intake  - hold IVF, patient appears volume overloaded  - continue pain control PRN  - continue drain w/ routine drain care  - plan for discharge Monday

## 2018-08-18 NOTE — H&P
[facility]  Otorhinolaryngology-Head & Neck Surgery  History & Physical    Patient Name: Austyn Muñoz  MRN: [id number]  Admission Date: 8/17/2018  Attending Physician: Andrea Price MD   Primary Care Provider: Chung Mancilla MD    Patient information was obtained from patient and past medical records.     Subjective:     Chief Complaint/Reason for Admission: dehydration    History of Present Illness: 62 year old male who was referred to the Head and Neck Clinic by Dr. Munoz for p16+ SCC of the left tonsil. He has been followed at [place] and at [facility] for relapsed follicular lymphoma which was initially treated with R-CHOP, then with Rituxan with good response. He is reportedly in remission from this. In early 2018, a routine PET scan revealed a slightly hypermetabolic area in the left tonsil, and a follow up PET in April revealed increased hypermetabolic activity in the left tonsil (10.2). Given his history, he underwent tonsillectomy with Dr. Munoz on 5/14. He is now s/p radical tonsillectomy and SND on 8/15/18. He was discharged home on 8/16/18 and developed worsening pain and decreased PO intake. He came to clinic this morning feeling unwell and dehydrated.    Medications:  Continuous Infusions:   dextrose 5 % and 0.45 % NaCl with KCl 20 mEq 125 mL/hr at 08/17/18 1554     Scheduled Meds:   [START ON 8/18/2018] enoxaparin  40 mg Subcutaneous Daily     PRN Meds:acetaminophen, diphenhydrAMINE, HYDROcodone-acetaminophen, ibuprofen, morphine, ondansetron, promethazine (PHENERGAN) IVPB, ramelteon     No current facility-administered medications on file prior to encounter.      Current Outpatient Medications on File Prior to Encounter   Medication Sig    aspirin (ECOTRIN) 81 MG EC tablet Take 81 mg by mouth once daily.    atenolol (TENORMIN) 50 MG tablet Take 50 mg by mouth every morning.     candesartan (ATACAND) 8 MG tablet Take 8 mg by mouth every morning.     clindamycin  (CLEOCIN) 300 MG capsule Take 1 capsule (300 mg total) by mouth every 6 (six) hours. for 5 days    clindamycin (CLEOCIN) 300 MG capsule Take 1 capsule (300 mg total) by mouth every 6 (six) hours. for 7 days    HYDROcodone-acetaminophen (NORCO) 7.5-325 mg per tablet Take 1 tablet by mouth every 4 (four) hours as needed for Pain.    ondansetron (ZOFRAN-ODT) 4 MG TbDL Take 1 tablet (4 mg total) by mouth every 6 (six) hours as needed.    rosuvastatin (CRESTOR) 10 MG tablet Take 10 mg by mouth every morning.     travoprost, benzalkonium, (TRAVATAN) 0.004 % ophthalmic solution Place 1 drop into both eyes every evening.     [DISCONTINUED] folic acid (FOLVITE) 1 MG tablet Take 1 mg by mouth once daily.    [DISCONTINUED] ondansetron (ZOFRAN-ODT) 8 MG TbDL Take 1 tablet (8 mg total) by mouth every 12 (twelve) hours as needed.       Review of patient's allergies indicates:   Allergen Reactions    Augmentin [amoxicillin-pot clavulanate] Rash       Past Medical History:   Diagnosis Date    Atrial fibrillation, currently in sinus rhythm     Glaucoma     Hyperlipidemia     Hypertension     Neuropathy     Non Hodgkin's lymphoma     in remission    Squamous cell carcinoma of left tonsil      Past Surgical History:   Procedure Laterality Date    ATRIAL ABLATION SURGERY      CERVICAL FUSION      CERVICAL SPINE SURGERY      SHOULDER SURGERY      TONSILLECTOMY       Family History     Problem Relation (Age of Onset)    Cancer Mother, Father    Coronary artery disease Mother    Hypertension Mother        Tobacco Use    Smoking status: Former Smoker     Last attempt to quit: 2006     Years since quittin.9    Smokeless tobacco: Former User     Quit date: 2018   Substance and Sexual Activity    Alcohol use: Not on file    Drug use: Not on file    Sexual activity: Not on file     Review of Systems   Constitutional: Positive for fatigue and fever. Negative for chills.   HENT: Positive for trouble  swallowing. Negative for drooling, ear pain, facial swelling and voice change.    Respiratory: Negative for shortness of breath.      Objective:     Vital Signs (Most Recent):  Temp: 99.5 °F (37.5 °C) (08/17/18 1521)  Pulse: 66 (08/17/18 1521)  Resp: 20 (08/17/18 1521)  BP: (!) 174/85 (08/17/18 1521)  SpO2: (!) 93 % (08/17/18 1521) Vital Signs (24h Range):  Temp:  [98.7 °F (37.1 °C)-100.3 °F (37.9 °C)] 99.5 °F (37.5 °C)  Pulse:  [57-67] 66  Resp:  [16-20] 20  SpO2:  [93 %] 93 %  BP: (126-174)/(72-85) 174/85     Weight: 107.5 kg (236 lb 15.9 oz)  Body mass index is 32.14 kg/m².    Date 08/17/18 0700 - 08/18/18 0659   Shift 0793-6802 5800-4993 8585-5964 24 Hour Total   INTAKE   Shift Total(mL/kg)       OUTPUT   Drains  30  30   Shift Total(mL/kg)  30(0.3)  30(0.3)   Weight (kg)  107.5 107.5 107.5       Physical Exam  NAD  Awake and alert  Head AT/NC  Auricles WNL AU  Nose w/ normal external appearance  MMM, anterior tongue mobile, FOM soft, OP patent w/ midline uvula, fossa healing w. No bleeding  Neck soft, not TTP, normal ROM, no LAD. Wounds c/d/i, drain in place  Normal WOB, no stridor or stertor    Significant Labs:  ABGs: No results for input(s): PH, PCO2, HCO3, POCSATURATED, BE in the last 168 hours.  CBC:   Recent Labs   Lab  08/17/18   1015   WBC  14.17*   RBC  4.12*   HGB  13.1*   HCT  39.8*   PLT  161   MCV  97   MCH  31.8*   MCHC  32.9     CMP:   Recent Labs   Lab  08/17/18   1015   GLU  100   CALCIUM  9.8   ALBUMIN  3.8   PROT  6.6   NA  139   K  4.5   CO2  29   CL  102   BUN  11   CREATININE  1.0   ALKPHOS  51*   ALT  21   AST  20   BILITOT  2.1*     Coagulation: No results for input(s): LABPROT, INR, APTT in the last 168 hours.    Significant Diagnostics:  None    Assessment/Plan:     Dehydration    Will admit for obs and fluid resuscitation        Squamous cell carcinoma of left tonsil    Now s/p radical tonsillectomy and neck dissection on 8/15. Discharged home and developed inability to tolerate PO.  Patient admitted for observation and fluid resuscitation.     - admit to ENT  - clinda  - IVF  - CXR  - monitor drain output  - soft diet  - will continue to monitor          VTE Risk Mitigation (From admission, onward)        Ordered     enoxaparin injection 40 mg  Daily      08/17/18 1522     IP VTE HIGH RISK PATIENT  Once      08/17/18 1522     Place sequential compression device  Until discontinued      08/17/18 1522     Place DECLAN hose  Until discontinued      08/17/18 1522          Brijesh Stoll MD  Otorhinolaryngology-Head & Neck Surgery  [facility]

## 2018-08-18 NOTE — SUBJECTIVE & OBJECTIVE
Medications:  Continuous Infusions:   dextrose 5 % and 0.45 % NaCl with KCl 20 mEq 125 mL/hr at 08/17/18 1554     Scheduled Meds:   [START ON 8/18/2018] enoxaparin  40 mg Subcutaneous Daily     PRN Meds:acetaminophen, diphenhydrAMINE, HYDROcodone-acetaminophen, ibuprofen, morphine, ondansetron, promethazine (PHENERGAN) IVPB, ramelteon     No current facility-administered medications on file prior to encounter.      Current Outpatient Medications on File Prior to Encounter   Medication Sig    aspirin (ECOTRIN) 81 MG EC tablet Take 81 mg by mouth once daily.    atenolol (TENORMIN) 50 MG tablet Take 50 mg by mouth every morning.     candesartan (ATACAND) 8 MG tablet Take 8 mg by mouth every morning.     clindamycin (CLEOCIN) 300 MG capsule Take 1 capsule (300 mg total) by mouth every 6 (six) hours. for 5 days    clindamycin (CLEOCIN) 300 MG capsule Take 1 capsule (300 mg total) by mouth every 6 (six) hours. for 7 days    HYDROcodone-acetaminophen (NORCO) 7.5-325 mg per tablet Take 1 tablet by mouth every 4 (four) hours as needed for Pain.    ondansetron (ZOFRAN-ODT) 4 MG TbDL Take 1 tablet (4 mg total) by mouth every 6 (six) hours as needed.    rosuvastatin (CRESTOR) 10 MG tablet Take 10 mg by mouth every morning.     travoprost, benzalkonium, (TRAVATAN) 0.004 % ophthalmic solution Place 1 drop into both eyes every evening.     [DISCONTINUED] folic acid (FOLVITE) 1 MG tablet Take 1 mg by mouth once daily.    [DISCONTINUED] ondansetron (ZOFRAN-ODT) 8 MG TbDL Take 1 tablet (8 mg total) by mouth every 12 (twelve) hours as needed.       Review of patient's allergies indicates:   Allergen Reactions    Augmentin [amoxicillin-pot clavulanate] Rash       Past Medical History:   Diagnosis Date    Atrial fibrillation, currently in sinus rhythm     Glaucoma     Hyperlipidemia     Hypertension     Neuropathy     Non Hodgkin's lymphoma     in remission    Squamous cell carcinoma of left tonsil      Past  Surgical History:   Procedure Laterality Date    ATRIAL ABLATION SURGERY      CERVICAL FUSION      CERVICAL SPINE SURGERY      SHOULDER SURGERY      TONSILLECTOMY       Family History     Problem Relation (Age of Onset)    Cancer Mother, Father    Coronary artery disease Mother    Hypertension Mother        Tobacco Use    Smoking status: Former Smoker     Last attempt to quit: 2006     Years since quittin.9    Smokeless tobacco: Former User     Quit date: 2018   Substance and Sexual Activity    Alcohol use: Not on file    Drug use: Not on file    Sexual activity: Not on file     Review of Systems   Constitutional: Positive for fatigue and fever. Negative for chills.   HENT: Positive for trouble swallowing. Negative for drooling, ear pain, facial swelling and voice change.    Respiratory: Negative for shortness of breath.      Objective:     Vital Signs (Most Recent):  Temp: 99.5 °F (37.5 °C) (18 1521)  Pulse: 66 (18 1521)  Resp: 20 (18 152)  BP: (!) 174/85 (18 1521)  SpO2: (!) 93 % (18) Vital Signs (24h Range):  Temp:  [98.7 °F (37.1 °C)-100.3 °F (37.9 °C)] 99.5 °F (37.5 °C)  Pulse:  [57-67] 66  Resp:  [16-20] 20  SpO2:  [93 %] 93 %  BP: (126-174)/(72-85) 174/85     Weight: 107.5 kg (236 lb 15.9 oz)  Body mass index is 32.14 kg/m².    Date 18 07 - 18 0659   Shift 0530-3468 5780-7089 1506-9555 24 Hour Total   INTAKE   Shift Total(mL/kg)       OUTPUT   Drains  30  30   Shift Total(mL/kg)  30(0.3)  30(0.3)   Weight (kg)  107.5 107.5 107.5       Physical Exam  NAD  Awake and alert  Head AT/NC  Auricles WNL AU  Nose w/ normal external appearance  MMM, anterior tongue mobile, FOM soft, OP patent w/ midline uvula, fossa healing w. No bleeding  Neck soft, not TTP, normal ROM, no LAD. Wounds c/d/i, drain in place  Normal WOB, no stridor or stertor    Significant Labs:  ABGs: No results for input(s): PH, PCO2, HCO3, POCSATURATED, BE in the last 168  hours.  CBC:   Recent Labs   Lab  08/17/18   1015   WBC  14.17*   RBC  4.12*   HGB  13.1*   HCT  39.8*   PLT  161   MCV  97   MCH  31.8*   MCHC  32.9     CMP:   Recent Labs   Lab  08/17/18   1015   GLU  100   CALCIUM  9.8   ALBUMIN  3.8   PROT  6.6   NA  139   K  4.5   CO2  29   CL  102   BUN  11   CREATININE  1.0   ALKPHOS  51*   ALT  21   AST  20   BILITOT  2.1*     Coagulation: No results for input(s): LABPROT, INR, APTT in the last 168 hours.    Significant Diagnostics:  None

## 2018-08-18 NOTE — ASSESSMENT & PLAN NOTE
Patient with vague c/o chest discomfort w/ some SOB. CXR shows possible pulmonary edema w/ mild cardiomegaly. 12 lead ECG w/ no evidence of ACS.    - lasix 20 mg x 1  - will check troponin  - CT PE protocol

## 2018-08-18 NOTE — PROGRESS NOTES
Patient complaining of chest discomfort this evening. States he had an episode last night at home that resolved spontaneously. No pain or radiation.    PE:   Vitals: HR 62 R 18 /84 SpO2 95%  NAD, tired  Neck soft, not TTP, wounds c/d/i  Normal WOB, no stidor or stertor  No reproducible chest pain on palpation  BLE not TTP (negative Ryan and lorraine)    CXR shows no kendrick PNA, PTX, though there are some patchy infiltrates    12 lead ECG shows sinus renée w/ no ST changes or Q waves and is otherwise unremarkable    No evidence of ACS. Low likelihood of PE.   - will continue to monitor symptoms  - saline lock IVF  - if symptoms persist, will consult medicine  - low threshold for CT PE protocol

## 2018-08-18 NOTE — PROGRESS NOTES
[facility]  Otorhinolaryngology-Head & Neck Surgery  Progress Note    Subjective:     Post-Op Info:  * No surgery found *      Hospital Day: 2     Interval History: patient has some chest discomfort overnight though vital remained stable. 12 lead ECG with no signs of ACS. He has continued but improved discomfort this morning. Denies any leg pain    Medications:  Continuous Infusions:  Scheduled Meds:   atenolol  50 mg Oral QAM    clindamycin  300 mg Oral Q8H    enoxaparin  40 mg Subcutaneous Daily     PRN Meds:acetaminophen, diphenhydrAMINE, HYDROcodone-acetaminophen, ibuprofen, morphine, ondansetron, promethazine (PHENERGAN) IVPB, ramelteon     Review of patient's allergies indicates:   Allergen Reactions    Augmentin [amoxicillin-pot clavulanate] Rash     Objective:     Vital Signs (24h Range):  Temp:  [96.9 °F (36.1 °C)-100.3 °F (37.9 °C)] 98 °F (36.7 °C)  Pulse:  [57-67] 57  Resp:  [16-22] 20  SpO2:  [93 %-96 %] 96 %  BP: (126-179)/(71-87) 163/77        Lines/Drains/Airways     Drain                 Closed/Suction Drain 08/15/18 1734 Left Neck Bulb 15 Fr. 2 days          Peripheral Intravenous Line                 Peripheral IV - Single Lumen 08/17/18 1105 less than 1 day                Physical Exam  NAD  Awake and alert  Head AT/NC  Auricles WNL AU  Nose w/ normal external appearance  MMM, anterior tongue mobile, FOM soft, OP patent w/ midline uvula, left tonsillar fossa healing w/ no bleeding  Neck soft, not TTP, normal ROM, no LAD. Wounds c/d/i, drain in place  Normal WOB, no stridor or stertor  BLE nontender     Significant Labs:  BMP:   Recent Labs   Lab  08/18/18   0533   GLU  114*   CL  103   CO2  27   BUN  10   CREATININE  0.9   CALCIUM  9.6     CBC:   Recent Labs   Lab  08/18/18   0533   WBC  11.32   RBC  4.22*   HGB  13.8*   HCT  39.9*   PLT  165   MCV  95   MCH  32.7*   MCHC  34.6     CMP:   Recent Labs   Lab  08/17/18   1015  08/18/18   0533   GLU  100  114*   CALCIUM  9.8   9.6   ALBUMIN  3.8   --    PROT  6.6   --    NA  139  137   K  4.5  4.1   CO2  29  27   CL  102  103   BUN  11  10   CREATININE  1.0  0.9   ALKPHOS  51*   --    ALT  21   --    AST  20   --    BILITOT  2.1*   --        Significant Diagnostics:  X-Ray: I have reviewed all pertinent results/findings within the past 24 hours and my personal findings are:  patchy infiltrates on CXR    Assessment/Plan:     Chest discomfort    Patient with vague c/o chest discomfort w/ some SOB. CXR shows possible pulmonary edema w/ mild cardiomegaly. 12 lead ECG w/ no evidence of ACS.    - lasix 20 mg x 1  - will check troponin  - CT PE protocol         Dehydration    Resolving        Squamous cell carcinoma of left tonsil    Now s/p radical tonsillectomy and neck dissection on 8/15. Discharged home and developed inability to tolerate PO. Patient admitted for observation and fluid resuscitation.     - encourage PO intake  - hold IVF, patient appears volume overloaded  - continue pain control PRN  - continue drain w/ routine drain care  - plan for discharge Monday            Brijesh Stoll MD  Otorhinolaryngology-Head & Neck Surgery  [facility]

## 2018-08-18 NOTE — ASSESSMENT & PLAN NOTE
Now s/p radical tonsillectomy and neck dissection on 8/15. Discharged home and developed inability to tolerate PO. Patient admitted for observation and fluid resuscitation.     - admit to ENT  - clinda  - IVF  - CXR  - monitor drain output  - soft diet  - will continue to monitor

## 2018-08-18 NOTE — NURSING
Dr. Stoll saw this patient this shift for c/o discomfort to his chest and reports of shortness of breath.  CXR reviewed and EKG ordered.  This am, patient sitting in chair at bedside with no c/o pain or discomfort.  States he feels good.

## 2018-08-18 NOTE — HPI
62 year old male who was referred to the Head and Neck Clinic by Dr. Munoz for p16+ SCC of the left tonsil. He has been followed at [place] and at [facility] for relapsed follicular lymphoma which was initially treated with R-CHOP, then with Rituxan with good response. He is reportedly in remission from this. In early 2018, a routine PET scan revealed a slightly hypermetabolic area in the left tonsil, and a follow up PET in April revealed increased hypermetabolic activity in the left tonsil (10.2). Given his history, he underwent tonsillectomy with Dr. Munoz on 5/14. He is now s/p radical tonsillectomy and SND on 8/15/18. He was discharged home on 8/16/18 and developed worsening pain and decreased PO intake. He came to clinic this morning feeling unwell and dehydrated.

## 2018-08-18 NOTE — SUBJECTIVE & OBJECTIVE
Interval History: patient has some chest discomfort overnight though vital remained stable. 12 lead ECG with no signs of ACS. He has continued but improved discomfort this morning. Denies any leg pain    Medications:  Continuous Infusions:  Scheduled Meds:   atenolol  50 mg Oral QAM    clindamycin  300 mg Oral Q8H    enoxaparin  40 mg Subcutaneous Daily     PRN Meds:acetaminophen, diphenhydrAMINE, HYDROcodone-acetaminophen, ibuprofen, morphine, ondansetron, promethazine (PHENERGAN) IVPB, ramelteon     Review of patient's allergies indicates:   Allergen Reactions    Augmentin [amoxicillin-pot clavulanate] Rash     Objective:     Vital Signs (24h Range):  Temp:  [96.9 °F (36.1 °C)-100.3 °F (37.9 °C)] 98 °F (36.7 °C)  Pulse:  [57-67] 57  Resp:  [16-22] 20  SpO2:  [93 %-96 %] 96 %  BP: (126-179)/(71-87) 163/77        Lines/Drains/Airways     Drain                 Closed/Suction Drain 08/15/18 1734 Left Neck Bulb 15 Fr. 2 days          Peripheral Intravenous Line                 Peripheral IV - Single Lumen 08/17/18 1105 less than 1 day                Physical Exam  NAD  Awake and alert  Head AT/NC  Auricles WNL AU  Nose w/ normal external appearance  MMM, anterior tongue mobile, FOM soft, OP patent w/ midline uvula, left tonsillar fossa healing w/ no bleeding  Neck soft, not TTP, normal ROM, no LAD. Wounds c/d/i, drain in place  Normal WOB, no stridor or stertor  BLE nontender     Significant Labs:  BMP:   Recent Labs   Lab  08/18/18   0533   GLU  114*   CL  103   CO2  27   BUN  10   CREATININE  0.9   CALCIUM  9.6     CBC:   Recent Labs   Lab  08/18/18   0533   WBC  11.32   RBC  4.22*   HGB  13.8*   HCT  39.9*   PLT  165   MCV  95   MCH  32.7*   MCHC  34.6     CMP:   Recent Labs   Lab  08/17/18   1015  08/18/18   0533   GLU  100  114*   CALCIUM  9.8  9.6   ALBUMIN  3.8   --    PROT  6.6   --    NA  139  137   K  4.5  4.1   CO2  29  27   CL  102  103   BUN  11  10   CREATININE  1.0  0.9   ALKPHOS  51*   --    ALT  21    --    AST  20   --    BILITOT  2.1*   --        Significant Diagnostics:  X-Ray: I have reviewed all pertinent results/findings within the past 24 hours and my personal findings are:  patchy infiltrates on CXR

## 2018-08-19 PROCEDURE — 27000646 HC AEROBIKA DEVICE

## 2018-08-19 PROCEDURE — 99900035 HC TECH TIME PER 15 MIN (STAT)

## 2018-08-19 PROCEDURE — 63600175 PHARM REV CODE 636 W HCPCS: Performed by: OTOLARYNGOLOGY

## 2018-08-19 PROCEDURE — 11000001 HC ACUTE MED/SURG PRIVATE ROOM

## 2018-08-19 PROCEDURE — 25000003 PHARM REV CODE 250: Performed by: OTOLARYNGOLOGY

## 2018-08-19 PROCEDURE — 94799 UNLISTED PULMONARY SVC/PX: CPT

## 2018-08-19 PROCEDURE — 94664 DEMO&/EVAL PT USE INHALER: CPT

## 2018-08-19 RX ORDER — CANDESARTAN 8 MG/1
8 TABLET ORAL EVERY MORNING
Status: DISCONTINUED | OUTPATIENT
Start: 2018-08-19 | End: 2018-08-20 | Stop reason: HOSPADM

## 2018-08-19 RX ADMIN — HYDROCODONE BITARTRATE AND ACETAMINOPHEN 1 TABLET: 5; 325 TABLET ORAL at 06:08

## 2018-08-19 RX ADMIN — ENOXAPARIN SODIUM 40 MG: 100 INJECTION SUBCUTANEOUS at 06:08

## 2018-08-19 RX ADMIN — HYDROCODONE BITARTRATE AND ACETAMINOPHEN 1 TABLET: 5; 325 TABLET ORAL at 09:08

## 2018-08-19 RX ADMIN — CANDESARTAN CILEXETIL 8 MG: 8 TABLET ORAL at 09:08

## 2018-08-19 RX ADMIN — RAMELTEON 8 MG: 8 TABLET, FILM COATED ORAL at 09:08

## 2018-08-19 RX ADMIN — CLINDAMYCIN HYDROCHLORIDE 300 MG: 150 CAPSULE ORAL at 02:08

## 2018-08-19 RX ADMIN — HYDROCODONE BITARTRATE AND ACETAMINOPHEN 1 TABLET: 5; 325 TABLET ORAL at 11:08

## 2018-08-19 RX ADMIN — CLINDAMYCIN HYDROCHLORIDE 300 MG: 150 CAPSULE ORAL at 06:08

## 2018-08-19 RX ADMIN — CLINDAMYCIN HYDROCHLORIDE 300 MG: 150 CAPSULE ORAL at 09:08

## 2018-08-19 RX ADMIN — HYDROCODONE BITARTRATE AND ACETAMINOPHEN 1 TABLET: 5; 325 TABLET ORAL at 04:08

## 2018-08-19 RX ADMIN — ATENOLOL 50 MG: 25 TABLET ORAL at 06:08

## 2018-08-19 NOTE — PLAN OF CARE
"Problem: Fall Risk (Adult)  Goal: Absence of Falls  Patient will demonstrate the desired outcomes by discharge/transition of care.  Outcome: Ongoing (interventions implemented as appropriate)  Patient has had no falls so far this shift.  Patient is ambulatory.  Patient is continent of bowel and bladder.  Bed is in lowest position and locked.  Incision to left side of neck is intact.  J Copeland draining pink/brown drainage.  Patient requested a "pain pill" before bedtime.  At 2043, gave Hydrocodone-acetaminophen 5-325 mg po for pain.  Patient has effective results as evidenced by resting without signs and symptoms of pain or discomfort.        "

## 2018-08-19 NOTE — ASSESSMENT & PLAN NOTE
Patient with vague c/o chest discomfort w/ some SOB. CXR shows possible pulmonary edema w/ mild cardiomegaly. 12 lead ECG w/ no evidence of ACS. CT PE and troponin-I negative. Potentially d/t consolidation vs atelectasis. Patient remains afebrile.    - start CPT today  - repeat CXR  - encourage ambulation   - will consult respiratory for atelectasis prevention  - IS to bedside

## 2018-08-19 NOTE — ASSESSMENT & PLAN NOTE
Now s/p radical tonsillectomy and neck dissection on 8/15. Discharged home and developed inability to tolerate PO. Patient admitted for observation and fluid resuscitation. Patient improving this morning    - encourage PO intake  - encourage ambulation  - hold IVF  - continue pain control PRN  - continue drain w/ routine drain care. Will remove drain on 8/20  - plan for discharge 8/20

## 2018-08-19 NOTE — ASSESSMENT & PLAN NOTE
Now s/p radical tonsillectomy and neck dissection on 8/15. Discharged home and developed inability to tolerate PO. Patient admitted for fluid resuscitation and improved symptom management. Patient improving this morning. His optimism and desire to leave the hospital led to a premature discharge and his subsequent return.    - encourage PO intake  - encourage ambulation  - hold IVF  - continue pain control PRN  - continue drain w/ routine drain care. Will remove drain on 8/20  - plan for discharge 8/20

## 2018-08-19 NOTE — PROGRESS NOTES
[facility]  Otorhinolaryngology-Head & Neck Surgery  Progress Note    Subjective:     Post-Op Info:  * No surgery found *      Hospital Day: 3     No new subjective & objective note has been filed under this hospital service since the last note was generated.    Assessment/Plan:     * Squamous cell carcinoma of left tonsil    Now s/p radical tonsillectomy and neck dissection on 8/15. Discharged home and developed inability to tolerate PO. Patient admitted for fluid resuscitation and improved symptom management. Patient improving this morning. His optimism and desire to leave the hospital led to a premature discharge and his subsequent return.    - encourage PO intake  - encourage ambulation  - hold IVF  - continue pain control PRN  - continue drain w/ routine drain care. Will remove drain on 8/20  - plan for discharge 8/20        Chest discomfort    Patient with vague c/o chest discomfort w/ some SOB. CXR shows possible pulmonary edema w/ mild cardiomegaly. 12 lead ECG w/ no evidence of ACS. CT PE and troponin-I negative. Potentially d/t consolidation vs atelectasis. Patient remains afebrile.    - start CPT today  - repeat CXR  - encourage ambulation   - will consult respiratory for atelectasis prevention  - IS to bedside           Dehydration    Resolving            Andrea Price MD  Otorhinolaryngology-Head & Neck Surgery  [facility]

## 2018-08-19 NOTE — SUBJECTIVE & OBJECTIVE
Interval History: NAEON. Continued chest discomfort. Pt c/o persistent cough this morning.    Medications:  Continuous Infusions:  Scheduled Meds:   atenolol  50 mg Oral QAM    candesartan  8 mg Oral QAM    clindamycin  300 mg Oral Q8H    enoxaparin  40 mg Subcutaneous Daily     PRN Meds:acetaminophen, diphenhydrAMINE, HYDROcodone-acetaminophen, ibuprofen, morphine, ondansetron, promethazine (PHENERGAN) IVPB, ramelteon     Review of patient's allergies indicates:   Allergen Reactions    Augmentin [amoxicillin-pot clavulanate] Rash     Objective:     Vital Signs (24h Range):  Temp:  [97.9 °F (36.6 °C)-98.5 °F (36.9 °C)] 97.9 °F (36.6 °C)  Pulse:  [57-69] 64  Resp:  [18-20] 20  SpO2:  [94 %-95 %] 94 %  BP: (122-180)/(70-92) 150/77        Lines/Drains/Airways     Drain                 Closed/Suction Drain 08/15/18 1734 Left Neck Bulb 15 Fr. 3 days          Peripheral Intravenous Line                 Peripheral IV - Single Lumen 08/17/18 1105 2 days         Peripheral IV - Single Lumen 08/18/18 1240 Anterior;Left;Proximal Forearm less than 1 day                Physical Exam    NAD  Awake and alert  Head AT/NC  Auricles WNL AU  Nose w/ normal external appearance  MMM, anterior tongue mobile, FOM soft, OP patent w/ midline uvula, left tonsillar fossa healing w/ no bleeding  Neck soft, not TTP, normal ROM, no LAD. Wounds c/d/i, drain in place  Normal WOB, no stridor or stertor  BLE nontender     Significant Labs:  BMP:   Recent Labs   Lab  08/18/18   0533   GLU  114*   CL  103   CO2  27   BUN  10   CREATININE  0.9   CALCIUM  9.6     CBC:   Recent Labs   Lab  08/18/18   0533   WBC  11.32   RBC  4.22*   HGB  13.8*   HCT  39.9*   PLT  165   MCV  95   MCH  32.7*   MCHC  34.6     CMP:   Recent Labs   Lab  08/17/18   1015  08/18/18   0533   GLU  100  114*   CALCIUM  9.8  9.6   ALBUMIN  3.8   --    PROT  6.6   --    NA  139  137   K  4.5  4.1   CO2  29  27   CL  102  103   BUN  11  10   CREATININE  1.0  0.9   ALKPHOS  51*    --    ALT  21   --    AST  20   --    BILITOT  2.1*   --        Significant Diagnostics:  X-Ray: I have reviewed all pertinent results/findings within the past 24 hours and my personal findings are:  patchy infiltrates on CXR

## 2018-08-20 VITALS
TEMPERATURE: 98 F | WEIGHT: 237 LBS | HEART RATE: 80 BPM | BODY MASS INDEX: 32.1 KG/M2 | OXYGEN SATURATION: 95 % | SYSTOLIC BLOOD PRESSURE: 130 MMHG | DIASTOLIC BLOOD PRESSURE: 82 MMHG | RESPIRATION RATE: 18 BRPM | HEIGHT: 72 IN

## 2018-08-20 PROCEDURE — 25000003 PHARM REV CODE 250: Performed by: OTOLARYNGOLOGY

## 2018-08-20 PROCEDURE — 94761 N-INVAS EAR/PLS OXIMETRY MLT: CPT

## 2018-08-20 PROCEDURE — 94668 MNPJ CHEST WALL SBSQ: CPT

## 2018-08-20 RX ADMIN — CANDESARTAN CILEXETIL 8 MG: 8 TABLET ORAL at 06:08

## 2018-08-20 RX ADMIN — HYDROCODONE BITARTRATE AND ACETAMINOPHEN 1 TABLET: 5; 325 TABLET ORAL at 06:08

## 2018-08-20 RX ADMIN — ATENOLOL 50 MG: 25 TABLET ORAL at 06:08

## 2018-08-20 RX ADMIN — CLINDAMYCIN HYDROCHLORIDE 300 MG: 150 CAPSULE ORAL at 06:08

## 2018-08-20 NOTE — PLAN OF CARE
Patient discharged home today with no needs prior to attempted visit.  Patient instructed to call ENT clinic for appointment with Ngoc Rodrigues in one week.        08/20/18 1650   Final Note   Assessment Type Final Discharge Note   Discharge Disposition Home   Right Care Referral Info   Post Acute Recommendation No Care

## 2018-08-20 NOTE — DISCHARGE SUMMARY
[facility]  Otorhinolaryngology-Head & Neck Surgery  Discharge Summary      Patient Name: Austyn Muñoz  MRN: [id number]  Admission Date: 8/17/2018  Hospital Length of Stay: 3 days  Discharge Date and Time:  08/20/2018 7:17 AM  Attending Physician: Andrea Pirce MD   Discharging Provider: Kimberley Eden MD  Primary Care Provider: Chung Mancilla MD     HPI: Mr. Muñoz is a 62 year old male who was referred to the Head and Neck Clinic by Dr. Munoz for p16+ SCC of the left tonsil. He has been followed at [facility] and at [facility] for relapsed follicular lymphoma which was initially treated with R-CHOP, then with Rituxan with good response. He is reportedly in remission from this. In early 2018, a routine PET scan revealed a slightly hypermetabolic area in the left tonsil, and a follow up PET in April revealed increased hypermetabolic activity in the left tonsil (10.2). Given his history, he underwent tonsillectomy with Dr. Munoz on 5/14. He is now s/p radical tonsillectomy and SND on 8/15/18. He was discharged home on 8/16/18 and developed worsening pain and decreased PO intake. He came to clinic unwell and dehydrated.        * No surgery found *     Hospital Course: Patient admitted for poor PO intake. Given IV hydration and encouraged PO. Over next three days pain improved from surgical site, SATNAM drain output tapered off, and PO intake improved. Patient had an episode of chest discomfort, CXR demonstrating likely atelectasis, EKG wnl. On HOD#3 he was was tolerating PO, pain well controlled, chest discomfort resolved.     Consults:   Consults (From admission, onward)        Status Ordering Provider     Inpatient consult to PICC team ([facility]AS)  Once     Provider:  (Not yet assigned)    Completed ANDREA PRICE            Physical Exam  NAD  Awake and alert  Head AT/NC  Auricles WNL AU  Nose w/ normal external appearance  MMM, anterior tongue mobile, FOM soft, OP patent w/ midline  uvula, left tonsillar fossa healing w/ no bleeding  Neck soft, not TTP, normal ROM, no LAD. Wounds c/d/i with dermabond intact  Normal WOB, no stridor or stertor        Significant Diagnostic Studies: CXR - Improved aeration of the lungs bilaterally suggestive of improving pulmonary edema or infectious process.  Mild cardiomegaly.  Normal pulmonary vasculature.  No pneumothorax.    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    Specialty:  Otolaryngology  Why:  For wound re-check  Contact information:  [address] [facility] [address]  [phone]                 Patient Instructions:      Diet Adult Regular   Order Comments: Soft diet, advance as tolerated     Notify your health care provider if you experience any of the following:  persistent nausea and vomiting or diarrhea     Notify your health care provider if you experience any of the following:  severe uncontrolled pain     Notify your health care provider if you experience any of the following:  redness, tenderness, or signs of infection (pain, swelling, redness, odor or green/yellow discharge around incision site)     Notify your health care provider if you experience any of the following:   Order Comments: Inability to tolerate PO     No dressing needed   Order Comments: Ok to shower     Activity as tolerated    Order Comments: No heavy lifting or straining     Medications:  Reconciled Home Medications:      Medication List      CHANGE how you take these medications    ondansetron 4 MG Tbdl  Commonly known as:  ZOFRAN-ODT  Take 1 tablet (4 mg total) by mouth every 6 (six) hours as needed.  What changed:  Another medication with the same name was removed. Continue taking this medication, and follow the directions you see here.        CONTINUE taking these medications    aspirin 81 MG EC tablet  Commonly known as:  ECOTRIN  Take 81 mg by mouth once daily.     atenolol 50 MG tablet  Commonly known as:  TENORMIN  Take 50 mg by mouth every morning.     candesartan 8 MG tablet  Commonly known as:  ATACAND  Take 8 mg by mouth every morning.     HYDROcodone-acetaminophen 7.5-325 mg per tablet  Commonly known as:  NORCO  Take 1 tablet by mouth every 4 (four) hours as needed for Pain.     rosuvastatin 10 MG tablet  Commonly known as:  CRESTOR  Take 10 mg by mouth every morning.     travoprost (benzalkonium) 0.004 % ophthalmic solution  Commonly known as:  TRAVATAN  Place 1 drop into both eyes every evening.        STOP taking these medications    clindamycin 300 MG capsule  Commonly known as:  CLEOCIN     folic acid 1 MG tablet  Commonly known as:  FOLVITE            Kimberley Eden MD  Otorhinolaryngology-Head & Neck Surgery  [facility]

## 2018-08-20 NOTE — NURSING
Patient alert and oriented.  Team made rounds and removed J-Copeland to left neck.  Incision and site free from drainage.  Pain medication given this am.

## 2018-08-20 NOTE — PLAN OF CARE
Problem: Patient Care Overview  Goal: Plan of Care Review  Outcome: Outcome(s) achieved Date Met: 08/20/18  Pt free of falls/trauma/injuries. Bed in low position, wheels locked, and call light within reach. Skin integrity remains unchanged. PIVs removed and intact with gauze and co-band applied. Discharge orders/instructions from AVS given and reviewed with pt and spouse, verbalized understanding. VSS and afebrile. No distress noted/reported at this time. Will continue to monitor while awaiting transport.

## 2018-08-20 NOTE — PLAN OF CARE
Patient is a 62 year old male re-admitted from clinic, sent to urgent care for 1L NS over one hour while waiting for hospital bed 8/17/2018.  Patient had some chest pain with SOB 8/18/2018 with negative EKG, CXray.  Patient discharged home today with no needs prior to visit.    Patient known to this CM from previous admit.  Patient lives in a one story home with his wife who drove him home today.    PCP  Chung Mancilla MD  [address] [facility] / [facility]*  [phone]      [id number] - [place], [place] - [address]  [address] [address]  Phone: [phone] Fax: [phone]    [facility] [address] [address]  Phone: [phone] Fax: [phone]      Extended Emergency Contact Information  Primary Emergency Contact: Erin Muñoz  Address: [address] [place] [address] of [facility]  Home Phone: [phone]  Mobile Phone: [phone]  Relation: Spouse       08/20/18 1647   Discharge Assessment   Assessment Type Discharge Planning Assessment   Assessment information obtained from? Medical Record   Prior to hospitilization cognitive status: Alert/Oriented   Prior to hospitalization functional status: Independent;Needs Assistance   Current cognitive status: Alert/Oriented   Current Functional Status: Independent;Needs Assistance   Lives With spouse   Able to Return to Prior Arrangements yes   Is patient able to care for self after discharge? Yes   Who are your caregiver(s) and their phone number(s)? wife   Patient's perception of discharge disposition home or selfcare   Equipment Currently Used at Home none   Do you have any problems affording any of your prescribed medications? No   Is the patient taking medications as prescribed? yes   Does the patient have transportation home? Yes   Transportation Available family or friend will provide   Discharge Plan A Home

## 2018-08-21 ENCOUNTER — NURSE TRIAGE (OUTPATIENT)
Dept: ADMINISTRATIVE | Facility: CLINIC | Age: 63
End: 2018-08-21

## 2018-08-22 NOTE — TELEPHONE ENCOUNTER
Reason for Disposition   Fever > 100.5 F (38.1 C)    Answer Assessment - Initial Assessment Questions  Pt had radical tonsillectomy and neck dissection on 8/15 as reported by his wife. Discharged yesterday. Today has started with fever this pm. Current temp is 100.5. No instructions given as to when to report fever wife stated.    Protocols used: ST POST-OP SYMPTOMS AND [id number]

## 2018-08-23 ENCOUNTER — TELEPHONE (OUTPATIENT)
Dept: OTOLARYNGOLOGY | Facility: CLINIC | Age: 63
End: 2018-08-23

## 2018-08-24 ENCOUNTER — TELEPHONE (OUTPATIENT)
Dept: OTOLARYNGOLOGY | Facility: CLINIC | Age: 63
End: 2018-08-24

## 2018-08-24 NOTE — TELEPHONE ENCOUNTER
Mrs. Muñoz notified per horace Shook to follow up with Dr. Munoz. Op note and path report faxed to Dr. Munoz at [phone] and Dr. Art Grant at [phone], per patient request.

## 2018-08-24 NOTE — TELEPHONE ENCOUNTER
----- Message from Andrea Price MD sent at 8/23/2018  6:28 PM CDT -----  Contact: Erin patients wife  He can follow up with Dr. Munoz.   Can you fax the op note and pathology report?  Thanks  B  ----- Message -----  From: Vilma Lazar RN  Sent: 8/23/2018  11:43 AM  To: Andrea Price MD, Ngoc Rodrigues NP    Post-op with provider closer to home??    -Rosy    ----- Message -----  From: Elvia Erickson  Sent: 8/23/2018  11:19 AM  To: Albert PANDYA Staff    Needs Advice    Reason for call:  Calling for further post instruction. Also, wants to know if patients initial po can be with an ENT closer to where the patient lives.         Communication Preference: [phone]

## 2021-05-12 ENCOUNTER — PATIENT MESSAGE (OUTPATIENT)
Dept: RESEARCH | Facility: HOSPITAL | Age: 66
End: 2021-05-12

## (undated) DEVICE — STIMULATOR NRVE MINI VARI-STIM

## (undated) DEVICE — SPONGE LAP 18X18 PREWASHED

## (undated) DEVICE — SUT 2-0 12-18IN SILK

## (undated) DEVICE — SKINMARKER & RULER REGULAR X-F

## (undated) DEVICE — SEE MEDLINE ITEM 146372

## (undated) DEVICE — SUT ETHILON 3-0 PS2 18 BLK

## (undated) DEVICE — SEE MEDLINE ITEM 154981

## (undated) DEVICE — DRAPE STERI INSTRUMENT 1018

## (undated) DEVICE — ADHESIVE DERMABOND ADVANCED

## (undated) DEVICE — TRAY MINOR GEN SURG

## (undated) DEVICE — SHEET EENT SPLIT

## (undated) DEVICE — ELECTRODE REM PLYHSV RETURN 9

## (undated) DEVICE — SEE MEDLINE ITEM 152622

## (undated) DEVICE — PENCIL ROCKER SWITCH 10FT CORD

## (undated) DEVICE — SPONGE TONSIL LARGE

## (undated) DEVICE — WARMER DRAPE STERILE LF

## (undated) DEVICE — HOOK LONE STAR BLUNT 12MM

## (undated) DEVICE — SUT 2/0 30IN SILK BLK BRAI

## (undated) DEVICE — SUT MCRYL PLUS 4-0 PS2 27IN

## (undated) DEVICE — GAUZE SPONGE PEANUT STRL

## (undated) DEVICE — KIT ANTIFOG

## (undated) DEVICE — ELECTRODE BLADE INSULATED 1 IN

## (undated) DEVICE — NDL HYPO REG 25G X 1 1/2

## (undated) DEVICE — SUT 4-0 VICRYL / SH

## (undated) DEVICE — HEMOSTAT SURGICEL 2X3IN

## (undated) DEVICE — KIT SURGIFLO EVITHROM

## (undated) DEVICE — SUT VICRYL 3-0 27 SH

## (undated) DEVICE — CATH ALL PUR URTHL RR 10FR

## (undated) DEVICE — SUT VICRYL PLUS 3-0 SH 18IN

## (undated) DEVICE — SUT 3-0 12-18IN SILK

## (undated) DEVICE — CORD BIPOLAR 12 FOOT

## (undated) DEVICE — TRAY FOLEY 16FR INFECTION CONT

## (undated) DEVICE — SUCTION COAGULATOR 10FR 6IN

## (undated) DEVICE — COVER LIGHT HANDLE 80/CA

## (undated) DEVICE — CLIP MED TICALL

## (undated) DEVICE — SUT LIGACLIP SMALL XTRA

## (undated) DEVICE — CONTAINER SPECIMEN STRL 4OZ

## (undated) DEVICE — SEE MEDLINE ITEM 157194

## (undated) DEVICE — SEE MEDLINE ITEM 157128

## (undated) DEVICE — BLADE SURG #15 CARBON STEEL

## (undated) DEVICE — TRAY ENT 4/CS